# Patient Record
Sex: FEMALE | Race: WHITE | NOT HISPANIC OR LATINO | Employment: OTHER | URBAN - METROPOLITAN AREA
[De-identification: names, ages, dates, MRNs, and addresses within clinical notes are randomized per-mention and may not be internally consistent; named-entity substitution may affect disease eponyms.]

---

## 2017-01-23 RX ORDER — MECLIZINE HCL 12.5 MG 12.5 MG/1
TABLET ORAL
Qty: 90 TABLET | Refills: 0 | Status: SHIPPED | OUTPATIENT
Start: 2017-01-23 | End: 2017-03-04 | Stop reason: SDUPTHER

## 2017-03-06 RX ORDER — MECLIZINE HCL 12.5 MG 12.5 MG/1
TABLET ORAL
Qty: 90 TABLET | Refills: 0 | Status: ON HOLD | OUTPATIENT
Start: 2017-03-06 | End: 2023-05-12 | Stop reason: ALTCHOICE

## 2017-03-17 DIAGNOSIS — E11.9 TYPE 2 DIABETES MELLITUS WITHOUT COMPLICATION: ICD-10-CM

## 2017-03-31 ENCOUNTER — PATIENT OUTREACH (OUTPATIENT)
Dept: ADMINISTRATIVE | Facility: HOSPITAL | Age: 67
End: 2017-03-31

## 2017-05-04 ENCOUNTER — TELEPHONE (OUTPATIENT)
Dept: CARDIOLOGY | Facility: CLINIC | Age: 67
End: 2017-05-04

## 2017-05-04 NOTE — TELEPHONE ENCOUNTER
HH nurse reporting 6# wt gain over last 5 days, pt has Bumex 1mg BID, and Aldactone 25mg daily, has appt to asee you 5/9, please advise

## 2017-05-05 ENCOUNTER — TELEPHONE (OUTPATIENT)
Dept: CARDIOLOGY | Facility: CLINIC | Age: 67
End: 2017-05-05

## 2017-05-05 NOTE — TELEPHONE ENCOUNTER
Dr Guerrier has not seen this pt for years, and will only advise after appt 5/9/17, HH will defer question to PCP

## 2017-05-09 ENCOUNTER — OFFICE VISIT (OUTPATIENT)
Dept: CARDIOLOGY | Facility: CLINIC | Age: 67
End: 2017-05-09
Payer: MEDICARE

## 2017-05-09 VITALS
SYSTOLIC BLOOD PRESSURE: 114 MMHG | BODY MASS INDEX: 34.91 KG/M2 | DIASTOLIC BLOOD PRESSURE: 66 MMHG | WEIGHT: 222.44 LBS | HEART RATE: 76 BPM | HEIGHT: 67 IN

## 2017-05-09 DIAGNOSIS — I48.0 PAF (PAROXYSMAL ATRIAL FIBRILLATION): ICD-10-CM

## 2017-05-09 DIAGNOSIS — I50.30 CONGESTIVE HEART FAILURE WITH LV DIASTOLIC DYSFUNCTION, NYHA CLASS 2: Primary | ICD-10-CM

## 2017-05-09 DIAGNOSIS — Z79.01 LONG TERM (CURRENT) USE OF ANTICOAGULANTS: ICD-10-CM

## 2017-05-09 DIAGNOSIS — E66.9 OBESITY (BMI 30.0-34.9): ICD-10-CM

## 2017-05-09 DIAGNOSIS — Z01.810 ENCOUNTER FOR PRE-OPERATIVE CARDIOVASCULAR CLEARANCE: ICD-10-CM

## 2017-05-09 PROBLEM — E66.811 OBESITY (BMI 30.0-34.9): Status: ACTIVE | Noted: 2017-05-09

## 2017-05-09 PROCEDURE — 99214 OFFICE O/P EST MOD 30 MIN: CPT | Mod: S$GLB,,, | Performed by: INTERNAL MEDICINE

## 2017-05-09 PROCEDURE — 1159F MED LIST DOCD IN RCRD: CPT | Mod: S$GLB,,, | Performed by: INTERNAL MEDICINE

## 2017-05-09 PROCEDURE — 1160F RVW MEDS BY RX/DR IN RCRD: CPT | Mod: S$GLB,,, | Performed by: INTERNAL MEDICINE

## 2017-05-09 PROCEDURE — 3078F DIAST BP <80 MM HG: CPT | Mod: S$GLB,,, | Performed by: INTERNAL MEDICINE

## 2017-05-09 PROCEDURE — 93000 ELECTROCARDIOGRAM COMPLETE: CPT | Mod: S$GLB,,, | Performed by: INTERNAL MEDICINE

## 2017-05-09 PROCEDURE — 3074F SYST BP LT 130 MM HG: CPT | Mod: S$GLB,,, | Performed by: INTERNAL MEDICINE

## 2017-05-09 PROCEDURE — 1125F AMNT PAIN NOTED PAIN PRSNT: CPT | Mod: S$GLB,,, | Performed by: INTERNAL MEDICINE

## 2017-05-09 PROCEDURE — 99999 PR PBB SHADOW E&M-EST. PATIENT-LVL III: CPT | Mod: PBBFAC,,, | Performed by: INTERNAL MEDICINE

## 2017-05-09 NOTE — PATIENT INSTRUCTIONS
Understanding Atrial Fibrillation    An arrhythmia is any problem with the speed or pattern of the heartbeat. Atrial fibrillation (AFib) is a common type of arrhythmia. It causes fast, chaotic electrical signals in the atria. This leads to poor functioning of the heart. It also affects how much blood your heart can pump out to the body.  Afib may occur once in a while and go away on its own. Or it may continue for longer periods and need treatment.  AFib can lead to serious problems, such as stroke. Your healthcare provider will need to monitor and manage it.  What happens during atrial fibrillation?   The heart has an electrical system that sends signals to control the heartbeat. As signals move through the heart, they tell the hearts upper chambers (atria) and lower chambers (ventricles) when to squeeze (contract) and relax. This lets blood move through the heart and out to the body and lungs.  With AFib, the atria receive abnormal signals. This causes them to contract in a fast and irregular way, and out of sync with the ventricles. When this happens, the atria also have a harder time moving blood into the ventricles. Blood may then pool in the atria, which increases the risk for blood clots and stroke. The ventricles also may contract too quickly and irregularly. As a result, they may not pump blood to the body and lungs as well as they should. This can weaken the heart muscle over time and cause heart failure.  What causes atrial fibrillation?  AFib is more common in older adults. It has many possible causes including:  · Coronary artery disease  · Heart valve disease  · Heart attack  · Heart surgery  · High blood pressure  · Thyroid disease  · Diabetes  · Lung disease  · Sleep apnea  · Heavy alcohol use  In some cases of AFib, doctors do not know the cause.  What are the symptoms of atrial fibrillation?  AFib may or may not cause symptoms. If symptoms do occur, they may include:  · A fast, pounding,  irregular heartbeat  · Shortness of breath  · Tiredness  · Dizziness or fainting  · Chest pain  How is atrial fibrillation treated?  Treatments for AFib can include any of the options below.  · Medicines. You may be prescribed:  ¨ Heart rate medicines to help slow down the heartbeat  ¨ Heart rhythm medicines to help the heart beat more regularly  ¨ Anti-clotting medicines to help reduce the risk for blood clots and stroke  · Electrical cardioversion. Your healthcare provider uses special pads or paddles to send one or more brief electrical shocks to the heart. This can help reset the heartbeat to normal.  · Ablation. Long, thin tubes called catheters are threaded through a blood vessel to the heart. There, the catheters send out hot or cold energy to the areas causing the abnormal signals. This energy destroys the problem tissue or cells. This improves the chances that your heart will stay in normal rhythm without using medicines. If your heart rate and rhythm cant be controlled, you may need ablation and a pacemaker. These will help control the heart rate and regularity of the heartbeat.  · Surgery. During surgery, your healthcare provider may use different methods to create scar tissue in the areas of the heart causing the abnormal signals. The scar tissue disrupts the abnormal signals and may stop AFib from occurring.  What are the complications of atrial fibrillation?  These can include:  · Blood clots  · Stroke  · Heart failure. This problem occurs when the heart muscle weakens so much that it can no longer pump blood well.  When should I call my healthcare provider?  Call your healthcare provider right away if you have any of these:  · Symptoms that dont get better with treatment, or get worse  · New symptoms   Date Last Reviewed: 5/1/2016  © 9042-7816 [employer]. [address] [place] [address], [place] [address]. All rights reserved. This information is not intended as a substitute for professional  medical care. Always follow your healthcare professional's instructions.        Eating Heart-Healthy Foods  Eating has a big impact on your heart health. In fact, eating healthier can improve several of your heart risks at once. For instance, it helps you manage weight, cholesterol, and blood pressure. Here are ideas to help you make heart-healthy changes without giving up all the foods and flavors you love.  Getting started  · Talk with your health care provider about eating plans, such as the DASH or Mediterranean diet. You may also be referred to a dietitian.  · Change a few things at a time. Give yourself time to get used to a few eating changes before adding more.  · Work to create a tasty, healthy eating plan that you can stick to for the rest of your life.    Goals for healthy eating  Below are some tips to improve your eating habits:  · Limit saturated fats and trans fats. Saturated fats raise your levels of cholesterol, so keep these fats to a minimum. They are found in foods such as fatty meats, whole milk, cheese, and palm and coconut oils. Avoid trans fats because they lower good cholesterol as well as raise bad cholesterol. Trans fats are most often found in processed foods.  · Reduce sodium (salt) intake. Eating too much salt may increase your blood pressure. Limit your sodium intake to 2,300 milligrams (mg) per day, or less if your health care provider recommends it. Dining out less often and eating fewer processed foods are two great ways to decrease the amount of salt you consume.  · Managing calories. A calorie is a unit of energy. Your body burns calories for fuel, but if you eat more calories than your body burns, the extras are stored as fat. Your health care provider can help you create a diet plan to manage your calories. This will likely include eating healthier foods as well as exercising regularly. To help you track your progress, keep a diary to record what you eat and how often you  exercise.  Choose the right foods  Aim to make these foods staples of your diet. If you have diabetes, you may have different recommendations than what is listed here:  · Fruits and vegetable provide plenty of nutrients without a lot of calories. At meals, fill half your plate with these foods. Split the other half of your plate between whole grains and lean protein.  · Whole grains are high in fiber and rich in vitamins and nutrients. Good choices include whole-wheat bread, pasta, and brown rice.  · Lean proteins give you nutrition with less fat. Good choices include fish, skinless chicken, and beans.  · Low-fat or nonfat dairy provides nutrients without a lot of fat. Try low-fat or nonfat milk, cheese, or yogurt.  · Healthy fats can be good for you in small amounts. These are unsaturated fats, such as olive oil, nuts, and fish. Try to have at least 2 servings per week of fatty fish such as salmon, sardines, mackerel, rainbow trout, and albacore tuna. These contain omega-3 fatty acids, which are good for your heart. Flaxseed is another source of a heart-healthy fat.  More on heart healthy eating    Read food labels  Healthy eating starts at the grocery store. Be sure to pay attention to food labels on packaged foods. Look for products that are high in fiber and protein, and low in saturated fat, cholesterol, and sodium. Avoid products that contain trans fat. And pay close attention to serving size. For instance, if you plan to eat two servings, double all the numbers on the label.  Prepare food right  A key part of healthy cooking is cutting down on added fat and salt. Look on the internet for lower-fat, lower-sodium recipes. Also, try these tips:  · Remove fat from meat and skin from poultry before cooking.  · Skim fat from the surface of soups and sauces.  · Broil, boil, bake, steam, grill, and microwave food without added fats.  · Choose ingredients that spice up your food without adding calories, fat, or  sodium. Try these items: horseradish, hot sauce, lemon, mustard, nonfat salad dressings, and vinegar. For salt-free herbs and spices, try basil, cilantro, cinnamon, pepper, and rosemary.  Date Last Reviewed: 6/25/2015  © 6963-9570 [employer]. [address] [place] [address], [place] [address]. All rights reserved. This information is not intended as a substitute for professional medical care. Always follow your healthcare professional's instructions.        Heart Disease Education    The heart beats 60 to 100 times per minute, 24 hours a day. This equals almost 1000,000 times a day. It pumps blood with oxygen and nutrients to the tissues and organs of the body. But the heart is a muscle and needs its own supply of blood. Blood flow to the heart is supplied by the coronary arteries. Coronary artery disease (atherosclerosis) is a result of cholesterol, saturated fat, and calcium deposits (plaques) that build up inside the walls. This causes inflammation within the coronary arteries. These plaques narrow the artery and reduce blood flow to the heart muscle. The reduction in blood flow to the heart muscle decreases oxygen supply to the heart. If the narrowing is significant enough, the oxygen supply to one or more regions of the heart can be temporarily or permanently shut down. This can cause chest pain, and possibly death of heart tissue (heart attack).  Types of chest pain  Angina is the name for pain in the heart muscle. Angina is a warning sign of serious heart disease. When untreated it can lead to a heart attack, also known as acute myocardial infarction, or AMI. Angina occurs when there is not enough blood and oxygen flowing to the heart for the amount of work it is doing. This most often happens during physical exertion, when the heart is working hardest. It is usually relieved by rest or nitroglycerin. Angina may also occur after a large meal when extra blood is sent to the digestive organs and less goes  to the heart. In the case of advanced or unstable heart disease, angina can occur at rest or awaken you from sleep. Angina usually lasts from a few minutes up to 20 minutes or more. When treated early, the effects of angina can be reversed without permanent damage to the heart. Angina is a serious condition and needs to be evaluated by a medical professional immediately.  There are two types of angina -- stable and unstable:  · Stable angina usually occurs with a predictable level of activity. Being stable, its character, severity, and occurrence do not change much over time. It usually starts with activity, and resolves with rest or taking your medicine as instructed by your doctor. The symptoms usually do not last long.  · Unstable angina changes or gets worse over time. It is different from whatever you are used to. It may feel different or worse, begin without cause, occur with exercise or exertion, wake you up from sleep, and last longer. It may not respond in the same way as it does when you take your usual medicines for an attack. This type of angina can be a warning sign of an impending heart attack.     A heart attack is usually the result of a blood clot that suddenly forms in a coronary artery that has been narrowed with plaque. When this occurs, blood flow may be cut off to a part of the heart muscle, causing the cells to die. This weakens the pumping action of the heart, which affects the delivery of blood to all the other organs in the body including the brain. This damage is not reversible. However, early treatment can limit the amount of damage.  The pain you feel with angina and a heart attack may have a similar quality. However, it is usually different in intensity and duration. Here are some typical descriptions of a heart attack:  · It is most often experienced as a squeezing, crushing, pressure-like sensation in the center of the chest.  · It is sometimes described as something heavy sitting on  "my chest.  · It may feel more like a bad case of indigestion.  · The pain may spread from the chest to the arm, shoulder, throat or jaw.  · Sometimes the pain is not felt in the chest at all, but only in the arm, shoulder, throat or jaw.  · There may also be nausea, vomiting, dizziness or light-headedness, sweating and trouble breathing.  · Palpitations, or your heart beating rapidly  · A new, irregular heart beat  · Unexplained weakness  You may not be able to tell the difference between "bad" angina and a heart attack at home. Seek help if your symptoms are different than usual. Do not be in denial or just try to "tough it out."  Call 911  This is the fastest and safest way to get to the emergency department. The paramedics can also start treatment on the way to the hospital, saving valuable time for your heart.  · If the angina gets worse, if it continues, or if it stops and returns, call 911 immediately. Do not delay. You may be having a heart attack.  · After you call 911, take a second tablet or spray unless instructed otherwise. When repeating doses, sit down if possible, because it can make you feel lightheaded or dizzy. Wait another 5 minutes. If the angina still does not go away, take a third tablet or spray. Do not take more than 3 tablets or sprays within 15 minutes. Stay on the phone with 911 for further instruction.  · Your healthcare provider may give you slightly different instructions than those above. If so, follow them carefully.  Do not wait until symptoms become severe to call 911.  Other reasons to call 911 include:  · Trouble breathing  · Feeling lightheaded, faint, or dizzy  · Rapid heart beat  · Slower than usual heart rate compared to your normal  · Angina with weakness, dizziness, fainting, heavy sweating, nausea, or vomiting  · Extreme drowsiness, confusion  · Weakness of an arm or leg or one side of the face  · Difficulty with speech or vision  When to seek medical care  Remember, the " "signs and symptoms of a heart attack are not always like they are on TV. Sometimes they are not so obvious. You may only feel weak, or just not right. If it is not clear or if you have any doubt, call for advice.  · Seek help if there is a change in the type of pain, if it feels different, or if your symptoms are mild.  · Do not drive yourself. Have someone else drive you. If no one can drive, call 911.  · Do not delay. Fast diagnosis and treatment can prevent or limit the amount of heart damage during a heart attack.  · Do not go to your doctor's office or a clinic as they may not be able to provide all the testing and treatment required for this condition.  · If your doctor has given you medicine to take when symptoms occur, take them but don't delay getting help trying to locate medicines.  What happens in the emergency department  The emergency department is connected to your local emergency medical system (EMS) through 911. That's why during a cardiac emergency, calling 911 is the fastest way to get help. The goal of the emergency department is to rapidly screen, evaluate, and treat people.  Once you are there, an electrocardiogram (ECG or heart tracing) will be done. Blood samples may be taken to look for the presence of heart enzymes that leak from damaged heart cells and show if a heart attack is occurring. You will often be evaluated by a heart specialist (cardiologist) who decides the best course of action. In the case of severe angina or early heart attack, and depending on the circumstances, powerful "clot busting" medicines can be used to dissolve blood clots in the coronary artery. In other cases, you may be taken to a cardiac catheterization lab. Here, a tiny balloon-tipped catheter is advanced through blood vessels to the heart. There the balloon is inflated pushing open the blood vessel restoring blood flow.  Risk factors for heart disease  Risk factors for heart disease are a combination of genetic " and lifestyle. Many risk factors work by either directly or indirectly damaging the blood vessels of the heart, or by increasing the risk of forming blood or cholesterol clots, which then clog up and block the arteries.     Examples of physical lifestyle risk factors:  · Cigarette smoking  · High blood pressure  · High blood cholesterol  · Use of stimulant drugs such as cocaine, crack, and amphetamines  · Eating a high-fat, high-cholesterol meal  · Diabetes   · Obesity which increases risk for diabetes and high blood pressure  · Lack of regular physical activity     Examples of emotional lifestyle factors:  · Chronic high stress levels release stress hormones. These raise blood pressure and cholesterol level and makes blood clot more easily.  · Held-in anger, hostile or cynical attitude  · Social and emotional isolation, lack of intimacy  · Loss of relationship  · Depression  Other factors that increase the risk of heart attack that you cannot control :  · Age. The older you get beyond 40, the greater is your risk of significant coronary artery disease.  · Gender. More men than women get heart disease; but once past menopause, women who are not taking estrogen replacement have the same risk as men for a heart attack.  · Family history. If your mother, father, brother or sister has coronary artery disease, your risk of having it is higher than a person your age without this family history.  What can you do to decrease your risk  To reduce your risk of heart disease:  · Get regular checkups with your doctor.  · Take your medicines for blood pressure, cholesterol or diabetes as directed.  · Watch your diet. Eat a heart healthy diet choosing fresh foods, less salt, cholesterol, and fat  · Stop smoking. Get help if needed.  · Get regular exercise.  · Manage stress.  · Carry a list of medicines and doses in your wallet.  Date Last Reviewed: 12/30/2015  © 7192-7233 [employer]. [address] [place] [address],  [place] [address]. All rights reserved. This information is not intended as a substitute for professional medical care. Always follow your healthcare professional's instructions.        What is Heart Failure?  The heart is a muscle that pumps oxygen-rich blood to all parts of the body. When you have heart failure, the heart is not able to pump as well as it should. Blood and fluid may back up into the lungs, and some parts of the body dont get enough oxygen-rich blood to work normally. These problems lead to the symptoms you feel.  When you have heart failure  With heart failure, not enough oxygen-rich blood leaves the heart with each beat. There are 2 types of heart failure. Both affect the ventricles ability to pump blood. You may have 1 or both types.       Systolic heart failure. The heart muscle becomes weak and enlarged. It cant pump enough oxygen-rich blood forward to the rest of the body when the ventricles contract. The measurement of how much blood your heart pushes out when it beats is called ejection fraction. In systolic heart failure, the ejection fraction is lower than normal. This can cause blood to back up into the lungs and cause shortness of breath and eventually ankle swelling (edema).  This is also called heart failure with reduced ejection fraction, or  HFrEF.    Diastolic heart failure. The heart muscle becomes stiff. It doesnt relax normally between contractions, which keeps the ventricles from filling with blood. Ejection fraction is often in the normal range. This can still lead to the backup of blood into the body and affect the organs such as the liver. This is also called heart failure with preserved ejection fraction or HFpEF.     Recognizing heart failure symptoms  When you have heart failure, you need to pay close attention to your body and how you feel, every single day. That way, if a problem occurs, you can get help before it becomes too severe. You'll need to watch for changes  in your symptoms. As long as symptoms stay about the same from one day to the next, your heart failure is stable. But if symptoms start to get worse, it's time to take action.  Signs and symptoms of worsening heart failure include:  · Rapid weight gain  · Shortness of breath  · Swelling (edema)  · Fatigue  How heart failure affects your body  When the heart doesn't pump enough blood, hormones (body chemicals) are sent to increase the amount of work the heart does. Some hormones make the heart grow larger. Others tell the heart to pump faster. As a result, the heart may pump more blood at first, but it can't keep up with the ongoing demands. So, the heart muscle becomes even more weak. Over time, even less blood is pumped through the heart. This leads to problems throughout the body as organs began to feel the effects of a long-term lack of oxygen. Eventually, if untreated, this can cause problems with your lungs, liver, kidneys, and loss of elasticity in the skin, and skin changes in the lower legs. A weak heart itself can eventually cause a severe decline in health and possible death if left untreated.  What is ejection fraction?  Ejection fraction (EF) measures how much blood the heart pumps out (ejects). This is measured to help diagnose heart failure. A healthy heart pumps at least half of the blood from the ventricles with each beat. This means a normal ejection fraction is around 50% to 70%. Your doctor will calculate ejection fraction from an echocardiogram.     My ejection fraction     Date: ______________________  Ejection fraction: _____________  Test used: __________________  Date Last Reviewed: 3/15/2016  © 3061-9088 [employer]. [address] [place] [address], [place] [address]. All rights reserved. This information is not intended as a substitute for professional medical care. Always follow your healthcare professional's instructions.        Weight Management: Exercise and Activity  Studies show  that people who exercise are the most likely to lose weight and keep it off. Exercise burns calories. It helps build muscle to make your body stronger. Make exercise an important part of your weight-management plan.    Make activity part of your day  You may not think you have the time to exercise. But you can work activity into your daily life--you just need to be committed. Take 10 minutes out of your lunch hour to take a walk. Walk to the [employer] to get your paper instead of having it delivered. Make it a habit to take the stairs instead of the elevator. Park in a far away parking spot instead of the closest. Youll be surprised at how fast these little changes can make a difference.  Some people really cannot walk very far, and tire out quickly with exercise. Instead of becoming discouraged, resolve to do what you can do, and work to make that a regular frequent habit.   The benefits of exercise  Exercise offers many benefits including:   · Exercise increases your metabolism (the speed at which your body burns calories).  · Regular exercise can increase the amount of muscle in your body. Muscle burns calories faster than fat. The more muscle you have, the more calories you burn.  · Exercise gives you energy and curbs your appetite.  · Exercise decreases stress and helps you sleep better.  Make exercise fun  Exercise can be fun. Choose an activity you enjoy. You may even get a friend to do it with you:  · Take a resistance-training or aerobics class  · Join a team sport  · Take a dance class  · Walk the dog  · Ride a bike  If you have health problems, be sure to ask your healthcare provider before you start an exercise program. Have a  help you develop a plan thats safe for you.   Date Last Reviewed: 2/4/2016  © 9704-2226 [employer]. [address] [place] [address], [place] [address]. All rights reserved. This information is not intended as a substitute for professional medical care.  Always follow your healthcare professional's instructions.

## 2017-05-09 NOTE — MR AVS SNAPSHOT
[facility]  [address] [facility] [address] [place] [address]  Phone: [phone]                  Aimee Aquino   2017 10:40 AM   Office Visit    Description:  Female : 1950   Provider:  Lily Guerrier MD   Department:  [place] - Cardiology           Reason for Visit     Congestive Heart Failure           Diagnoses this Visit        Comments    Congestive heart failure with LV diastolic dysfunction, NYHA class 2    -  Primary     PAF (paroxysmal atrial fibrillation)     STABLE, DECLINES COUMADIN---, HAD ISSUES IN THE PAST    Encounter for pre-operative cardiovascular clearance         Obesity (BMI 30.0-34.9)         Long term (current) use of anticoagulants                To Do List           Goals (5 Years of Data)     None      Follow-Up and Disposition     Return in about 3 months (around 2017).       These Medications        Disp Refills Start End    apixaban 2.5 mg Tab 60 tablet 3 2017     Take 1 tablet (2.5 mg total) by mouth 2 (two) times daily. - Oral    Pharmacy: [facility], [place] - [address] [facility] Ph #: [phone]         [facility]     [facility] Nurse Care [facility] -  Assistance  Unless otherwise directed by your provider, please contact [facility], our nurse care line that is available for  assistance.     Registered nurses in the [facility] provide: appointment scheduling, clinical advisement, health education, and other advisory services.  Call: [phone] (toll free)               Medications           Message regarding Medications     Verify the changes and/or additions to your medication regime listed below are the same as discussed with your clinician today.  If any of these changes or additions are incorrect, please notify your healthcare provider.        START taking these NEW medications        Refills    apixaban 2.5 mg Tab 3    Sig: Take 1 tablet (2.5 mg total) by mouth 2 (two)  times daily.    Class: Normal    Route: Oral      STOP taking these medications     calcitonin, salmon, (FORTICAL) 200 unit/actuation nasal spray 1 spray by Nasal route once daily. Each nostril    HUMALOG 100 unit/mL injection 15 Units as directed.     lisinopril (PRINIVIL,ZESTRIL) 2.5 MG tablet TAKE 1 TABLET ONCE A DAY ORALLY 90 DAYS    MAGNESIUM CARBONATE ORAL Take by mouth.    potassium chloride SA (K-DUR,KLOR-CON) 20 MEQ tablet Takes 2 tablet AM, 1 tablet with lunch, 2 tablets with Dinner, and 1 at bedtime.    spironolactone (ALDACTONE) 25 MG tablet Take 25 mg by mouth once daily.    sucralfate (CARAFATE) 1 gram tablet TAKE 1 TABLET (1 G TOTAL) BY MOUTH 4 (FOUR) TIMES DAILY.    tizanidine (ZANAFLEX) 2 MG tablet TAKE 2 TABLETS (4 MG TOTAL) BY MOUTH NIGHTLY AS NEEDED.           Verify that the below list of medications is an accurate representation of the medications you are currently taking.  If none reported, the list may be blank. If incorrect, please contact your healthcare provider. Carry this list with you in case of emergency.           Current Medications     ACETAMINOPHEN (TYLENOL EXTRA STRENGTH ORAL) Take by mouth every evening.    allopurinol (ZYLOPRIM) 300 MG tablet Take 1 tablet (300 mg total) by mouth once daily.    amitriptyline (ELAVIL) 150 MG Tab TAKE 1 TABLET (150 MG TOTAL) BY MOUTH EVERY EVENING.    ascorbic acid, vitamin C, (VITAMIN C) 1000 MG tablet Take 1,000 mg by mouth once daily.    biotin 2,500 mcg Cap Take 5,000 mg by mouth once daily.      bumetanide (BUMEX) 0.5 MG Tab TAKE 1 TABLET BY MOUTH EVERY MORNING MAY TAKE AN EXTRA DOSE IN PM FOR WEIGHT GAIN >3 LBS-2 DAYS    bumetanide (BUMEX) 1 MG tablet TAKE 1 TABLET (1 MG TOTAL) BY MOUTH 2 (TWO) TIMES DAILY.    cranberry 1,000 mg Cap Take 3,600 mg by mouth.      diclofenac sodium (VOLTAREN) 1 % Gel APPLY TOPICALLY 4 (FOUR) TIMES DAILY AS NEEDED.    dipyridamole-aspirin 200-25 mg (AGGRENOX)  mg CM12 TAKE 1 CAPSULE BY MOUTH 2 (TWO) TIMES  "DAILY.    estradiol (ESTRACE) 1 MG tablet 1 mg.     fluconazole (DIFLUCAN) 100 MG tablet TAKE 1 TABLET (100 MG TOTAL) BY MOUTH ONCE.    insulin glargine (LANTUS) 100 unit/mL injection Inject 68 Units into the skin once daily.     insulin syringe-needle U-100 (BD INSULIN SYRINGE ULTRA-FINE) 1 mL 31 x 5/16" Syrg USE 3 TIMES DAILY    lansoprazole (PREVACID) 30 MG capsule 1 CAP 30 MIN BEFORE BREAKFAST AND SUPPER    levothyroxine (SYNTHROID) 100 MCG tablet TAKE 1 TABLET (100 MCG TOTAL) BY MOUTH ONCE DAILY.    loratadine (CLARITIN) 10 mg tablet Take 10 mg by mouth once daily.    meclizine (ANTIVERT) 12.5 mg tablet TAKE 1 TABLET (12.5 MG TOTAL) BY MOUTH 3 (THREE) TIMES DAILY AS NEEDED.    mupirocin (BACTROBAN) 2 % ointment     oxycodone-acetaminophen (PERCOCET) 5-325 mg per tablet Take 1 tablet by mouth daily as needed for Pain.    PATANOL 0.1 % ophthalmic solution PLACE 1 DROP INTO BOTH EYES ONCE DAILY    pramipexole (MIRAPEX) 0.5 MG tablet TAKE 3 TABLETS BY MOUTH IN P.M.    pyridoxine, vitamin B6, (VITAMIN B-6) 100 MG Tab Take 50 mg by mouth once daily.    rosuvastatin (CRESTOR) 5 MG tablet Take 1 tablet (5 mg total) by mouth once daily.    selenium 200 mcg Cap Take 200 mg by mouth once daily.    tramadol (ULTRAM) 50 mg tablet TAKE 1 TABLET EVERY 4 HOURS AS NEEDED    vitamin D 1000 units Tab Take 185 mg by mouth once daily.    apixaban 2.5 mg Tab Take 1 tablet (2.5 mg total) by mouth 2 (two) times daily.           Clinical Reference Information           Your Vitals Were     BP Pulse Height Weight Last Period BMI    114/66 76 5' 7" (1.702 m) 100.9 kg (222 lb 7.1 oz) (LMP Unknown) 34.84 kg/m2      Blood Pressure          Most Recent Value    BP  114/66      Allergies as of 5/9/2017     Iodinated Contrast Media - Oral And Iv Dye    Adhesive Tape-silicones    Ambien [Zolpidem]    Codeine    Demerol [Meperidine]    Penicillin G    Sulfasalazine    Tetracycline      Immunizations Administered on Date of Encounter - 5/9/2017  "    None      Orders Placed During Today's Visit      Normal Orders This Visit    EKG 12-lead     Future Labs/Procedures Expected by Expires    Basic metabolic panel  5/9/2017 7/8/2018    Hemoglobin  5/9/2017 7/8/2018      [facility] Sign-Up     Activating your [facility] account is as easy as 1-2-3!     1) Visit [email or web address], select Sign Up Now, enter this activation code and your date of birth, then select Next.  [email or web address]  Expires: 6/23/2017 11:32 AM      2) Create a username and password to use when you visit [facility] in the future and select a security question in case you lose your password and select Next.    3) Enter your e-mail address and click Sign Up!    Additional Information  If you have questions, please e-mail [email or web address] or call [phone] to talk to our [facility] staff. Remember, [facility] is NOT to be used for urgent needs. For medical emergencies, dial 911.         Instructions      Understanding Atrial Fibrillation    An arrhythmia is any problem with the speed or pattern of the heartbeat. Atrial fibrillation (AFib) is a common type of arrhythmia. It causes fast, chaotic electrical signals in the atria. This leads to poor functioning of the heart. It also affects how much blood your heart can pump out to the body.  Afib may occur once in a while and go away on its own. Or it may continue for longer periods and need treatment.  AFib can lead to serious problems, such as stroke. Your healthcare provider will need to monitor and manage it.  What happens during atrial fibrillation?   The heart has an electrical system that sends signals to control the heartbeat. As signals move through the heart, they tell the hearts upper chambers (atria) and lower chambers (ventricles) when to squeeze (contract) and relax. This lets blood move through the heart and out to the body and lungs.  With AFib, the atria receive abnormal signals. This causes them to contract in a fast and irregular  way, and out of sync with the ventricles. When this happens, the atria also have a harder time moving blood into the ventricles. Blood may then pool in the atria, which increases the risk for blood clots and stroke. The ventricles also may contract too quickly and irregularly. As a result, they may not pump blood to the body and lungs as well as they should. This can weaken the heart muscle over time and cause heart failure.  What causes atrial fibrillation?  AFib is more common in older adults. It has many possible causes including:  · Coronary artery disease  · Heart valve disease  · Heart attack  · Heart surgery  · High blood pressure  · Thyroid disease  · Diabetes  · Lung disease  · Sleep apnea  · Heavy alcohol use  In some cases of AFib, doctors do not know the cause.  What are the symptoms of atrial fibrillation?  AFib may or may not cause symptoms. If symptoms do occur, they may include:  · A fast, pounding, irregular heartbeat  · Shortness of breath  · Tiredness  · Dizziness or fainting  · Chest pain  How is atrial fibrillation treated?  Treatments for AFib can include any of the options below.  · Medicines. You may be prescribed:  ¨ Heart rate medicines to help slow down the heartbeat  ¨ Heart rhythm medicines to help the heart beat more regularly  ¨ Anti-clotting medicines to help reduce the risk for blood clots and stroke  · Electrical cardioversion. Your healthcare provider uses special pads or paddles to send one or more brief electrical shocks to the heart. This can help reset the heartbeat to normal.  · Ablation. Long, thin tubes called catheters are threaded through a blood vessel to the heart. There, the catheters send out hot or cold energy to the areas causing the abnormal signals. This energy destroys the problem tissue or cells. This improves the chances that your heart will stay in normal rhythm without using medicines. If your heart rate and rhythm cant be controlled, you may need ablation  and a pacemaker. These will help control the heart rate and regularity of the heartbeat.  · Surgery. During surgery, your healthcare provider may use different methods to create scar tissue in the areas of the heart causing the abnormal signals. The scar tissue disrupts the abnormal signals and may stop AFib from occurring.  What are the complications of atrial fibrillation?  These can include:  · Blood clots  · Stroke  · Heart failure. This problem occurs when the heart muscle weakens so much that it can no longer pump blood well.  When should I call my healthcare provider?  Call your healthcare provider right away if you have any of these:  · Symptoms that dont get better with treatment, or get worse  · New symptoms   Date Last Reviewed: 5/1/2016  © 5381-4599 [employer]. [address] [place] [address], [place] [address]. All rights reserved. This information is not intended as a substitute for professional medical care. Always follow your healthcare professional's instructions.        Eating Heart-Healthy Foods  Eating has a big impact on your heart health. In fact, eating healthier can improve several of your heart risks at once. For instance, it helps you manage weight, cholesterol, and blood pressure. Here are ideas to help you make heart-healthy changes without giving up all the foods and flavors you love.  Getting started  · Talk with your health care provider about eating plans, such as the DASH or Mediterranean diet. You may also be referred to a dietitian.  · Change a few things at a time. Give yourself time to get used to a few eating changes before adding more.  · Work to create a tasty, healthy eating plan that you can stick to for the rest of your life.    Goals for healthy eating  Below are some tips to improve your eating habits:  · Limit saturated fats and trans fats. Saturated fats raise your levels of cholesterol, so keep these fats to a minimum. They are found in foods such as fatty  meats, whole milk, cheese, and palm and coconut oils. Avoid trans fats because they lower good cholesterol as well as raise bad cholesterol. Trans fats are most often found in processed foods.  · Reduce sodium (salt) intake. Eating too much salt may increase your blood pressure. Limit your sodium intake to 2,300 milligrams (mg) per day, or less if your health care provider recommends it. Dining out less often and eating fewer processed foods are two great ways to decrease the amount of salt you consume.  · Managing calories. A calorie is a unit of energy. Your body burns calories for fuel, but if you eat more calories than your body burns, the extras are stored as fat. Your health care provider can help you create a diet plan to manage your calories. This will likely include eating healthier foods as well as exercising regularly. To help you track your progress, keep a diary to record what you eat and how often you exercise.  Choose the right foods  Aim to make these foods staples of your diet. If you have diabetes, you may have different recommendations than what is listed here:  · Fruits and vegetable provide plenty of nutrients without a lot of calories. At meals, fill half your plate with these foods. Split the other half of your plate between whole grains and lean protein.  · Whole grains are high in fiber and rich in vitamins and nutrients. Good choices include whole-wheat bread, pasta, and brown rice.  · Lean proteins give you nutrition with less fat. Good choices include fish, skinless chicken, and beans.  · Low-fat or nonfat dairy provides nutrients without a lot of fat. Try low-fat or nonfat milk, cheese, or yogurt.  · Healthy fats can be good for you in small amounts. These are unsaturated fats, such as olive oil, nuts, and fish. Try to have at least 2 servings per week of fatty fish such as salmon, sardines, mackerel, rainbow trout, and albacore tuna. These contain omega-3 fatty acids, which are good for  your heart. Flaxseed is another source of a heart-healthy fat.  More on heart healthy eating    Read food labels  Healthy eating starts at the grocery store. Be sure to pay attention to food labels on packaged foods. Look for products that are high in fiber and protein, and low in saturated fat, cholesterol, and sodium. Avoid products that contain trans fat. And pay close attention to serving size. For instance, if you plan to eat two servings, double all the numbers on the label.  Prepare food right  A key part of healthy cooking is cutting down on added fat and salt. Look on the internet for lower-fat, lower-sodium recipes. Also, try these tips:  · Remove fat from meat and skin from poultry before cooking.  · Skim fat from the surface of soups and sauces.  · Broil, boil, bake, steam, grill, and microwave food without added fats.  · Choose ingredients that spice up your food without adding calories, fat, or sodium. Try these items: horseradish, hot sauce, lemon, mustard, nonfat salad dressings, and vinegar. For salt-free herbs and spices, try basil, cilantro, cinnamon, pepper, and rosemary.  Date Last Reviewed: 6/25/2015  © 8767-9708 [employer]. [address] [place] [address], [place] [address]. All rights reserved. This information is not intended as a substitute for professional medical care. Always follow your healthcare professional's instructions.        Heart Disease Education    The heart beats 60 to 100 times per minute, 24 hours a day. This equals almost 1000,000 times a day. It pumps blood with oxygen and nutrients to the tissues and organs of the body. But the heart is a muscle and needs its own supply of blood. Blood flow to the heart is supplied by the coronary arteries. Coronary artery disease (atherosclerosis) is a result of cholesterol, saturated fat, and calcium deposits (plaques) that build up inside the walls. This causes inflammation within the coronary arteries. These plaques narrow the  artery and reduce blood flow to the heart muscle. The reduction in blood flow to the heart muscle decreases oxygen supply to the heart. If the narrowing is significant enough, the oxygen supply to one or more regions of the heart can be temporarily or permanently shut down. This can cause chest pain, and possibly death of heart tissue (heart attack).  Types of chest pain  Angina is the name for pain in the heart muscle. Angina is a warning sign of serious heart disease. When untreated it can lead to a heart attack, also known as acute myocardial infarction, or AMI. Angina occurs when there is not enough blood and oxygen flowing to the heart for the amount of work it is doing. This most often happens during physical exertion, when the heart is working hardest. It is usually relieved by rest or nitroglycerin. Angina may also occur after a large meal when extra blood is sent to the digestive organs and less goes to the heart. In the case of advanced or unstable heart disease, angina can occur at rest or awaken you from sleep. Angina usually lasts from a few minutes up to 20 minutes or more. When treated early, the effects of angina can be reversed without permanent damage to the heart. Angina is a serious condition and needs to be evaluated by a medical professional immediately.  There are two types of angina -- stable and unstable:  · Stable angina usually occurs with a predictable level of activity. Being stable, its character, severity, and occurrence do not change much over time. It usually starts with activity, and resolves with rest or taking your medicine as instructed by your doctor. The symptoms usually do not last long.  · Unstable angina changes or gets worse over time. It is different from whatever you are used to. It may feel different or worse, begin without cause, occur with exercise or exertion, wake you up from sleep, and last longer. It may not respond in the same way as it does when you take your  "usual medicines for an attack. This type of angina can be a warning sign of an impending heart attack.     A heart attack is usually the result of a blood clot that suddenly forms in a coronary artery that has been narrowed with plaque. When this occurs, blood flow may be cut off to a part of the heart muscle, causing the cells to die. This weakens the pumping action of the heart, which affects the delivery of blood to all the other organs in the body including the brain. This damage is not reversible. However, early treatment can limit the amount of damage.  The pain you feel with angina and a heart attack may have a similar quality. However, it is usually different in intensity and duration. Here are some typical descriptions of a heart attack:  · It is most often experienced as a squeezing, crushing, pressure-like sensation in the center of the chest.  · It is sometimes described as something heavy sitting on my chest.  · It may feel more like a bad case of indigestion.  · The pain may spread from the chest to the arm, shoulder, throat or jaw.  · Sometimes the pain is not felt in the chest at all, but only in the arm, shoulder, throat or jaw.  · There may also be nausea, vomiting, dizziness or light-headedness, sweating and trouble breathing.  · Palpitations, or your heart beating rapidly  · A new, irregular heart beat  · Unexplained weakness  You may not be able to tell the difference between "bad" angina and a heart attack at home. Seek help if your symptoms are different than usual. Do not be in denial or just try to "tough it out."  Call 911  This is the fastest and safest way to get to the emergency department. The paramedics can also start treatment on the way to the hospital, saving valuable time for your heart.  · If the angina gets worse, if it continues, or if it stops and returns, call 911 immediately. Do not delay. You may be having a heart attack.  · After you call 911, take a second tablet or " spray unless instructed otherwise. When repeating doses, sit down if possible, because it can make you feel lightheaded or dizzy. Wait another 5 minutes. If the angina still does not go away, take a third tablet or spray. Do not take more than 3 tablets or sprays within 15 minutes. Stay on the phone with 911 for further instruction.  · Your healthcare provider may give you slightly different instructions than those above. If so, follow them carefully.  Do not wait until symptoms become severe to call 911.  Other reasons to call 911 include:  · Trouble breathing  · Feeling lightheaded, faint, or dizzy  · Rapid heart beat  · Slower than usual heart rate compared to your normal  · Angina with weakness, dizziness, fainting, heavy sweating, nausea, or vomiting  · Extreme drowsiness, confusion  · Weakness of an arm or leg or one side of the face  · Difficulty with speech or vision  When to seek medical care  Remember, the signs and symptoms of a heart attack are not always like they are on TV. Sometimes they are not so obvious. You may only feel weak, or just not right. If it is not clear or if you have any doubt, call for advice.  · Seek help if there is a change in the type of pain, if it feels different, or if your symptoms are mild.  · Do not drive yourself. Have someone else drive you. If no one can drive, call 911.  · Do not delay. Fast diagnosis and treatment can prevent or limit the amount of heart damage during a heart attack.  · Do not go to your doctor's office or a clinic as they may not be able to provide all the testing and treatment required for this condition.  · If your doctor has given you medicine to take when symptoms occur, take them but don't delay getting help trying to locate medicines.  What happens in the emergency department  The emergency department is connected to your local emergency medical system (EMS) through 911. That's why during a cardiac emergency, calling 911 is the fastest way to get  "help. The goal of the emergency department is to rapidly screen, evaluate, and treat people.  Once you are there, an electrocardiogram (ECG or heart tracing) will be done. Blood samples may be taken to look for the presence of heart enzymes that leak from damaged heart cells and show if a heart attack is occurring. You will often be evaluated by a heart specialist (cardiologist) who decides the best course of action. In the case of severe angina or early heart attack, and depending on the circumstances, powerful "clot busting" medicines can be used to dissolve blood clots in the coronary artery. In other cases, you may be taken to a cardiac catheterization lab. Here, a tiny balloon-tipped catheter is advanced through blood vessels to the heart. There the balloon is inflated pushing open the blood vessel restoring blood flow.  Risk factors for heart disease  Risk factors for heart disease are a combination of genetic and lifestyle. Many risk factors work by either directly or indirectly damaging the blood vessels of the heart, or by increasing the risk of forming blood or cholesterol clots, which then clog up and block the arteries.     Examples of physical lifestyle risk factors:  · Cigarette smoking  · High blood pressure  · High blood cholesterol  · Use of stimulant drugs such as cocaine, crack, and amphetamines  · Eating a high-fat, high-cholesterol meal  · Diabetes   · Obesity which increases risk for diabetes and high blood pressure  · Lack of regular physical activity     Examples of emotional lifestyle factors:  · Chronic high stress levels release stress hormones. These raise blood pressure and cholesterol level and makes blood clot more easily.  · Held-in anger, hostile or cynical attitude  · Social and emotional isolation, lack of intimacy  · Loss of relationship  · Depression  Other factors that increase the risk of heart attack that you cannot control :  · Age. The older you get beyond 40, the greater " is your risk of significant coronary artery disease.  · Gender. More men than women get heart disease; but once past menopause, women who are not taking estrogen replacement have the same risk as men for a heart attack.  · Family history. If your mother, father, brother or sister has coronary artery disease, your risk of having it is higher than a person your age without this family history.  What can you do to decrease your risk  To reduce your risk of heart disease:  · Get regular checkups with your doctor.  · Take your medicines for blood pressure, cholesterol or diabetes as directed.  · Watch your diet. Eat a heart healthy diet choosing fresh foods, less salt, cholesterol, and fat  · Stop smoking. Get help if needed.  · Get regular exercise.  · Manage stress.  · Carry a list of medicines and doses in your wallet.  Date Last Reviewed: 12/30/2015  © 3795-6728 [employer]. [address] [place] [address]. All rights reserved. This information is not intended as a substitute for professional medical care. Always follow your healthcare professional's instructions.        What is Heart Failure?  The heart is a muscle that pumps oxygen-rich blood to all parts of the body. When you have heart failure, the heart is not able to pump as well as it should. Blood and fluid may back up into the lungs, and some parts of the body dont get enough oxygen-rich blood to work normally. These problems lead to the symptoms you feel.  When you have heart failure  With heart failure, not enough oxygen-rich blood leaves the heart with each beat. There are 2 types of heart failure. Both affect the ventricles ability to pump blood. You may have 1 or both types.       Systolic heart failure. The heart muscle becomes weak and enlarged. It cant pump enough oxygen-rich blood forward to the rest of the body when the ventricles contract. The measurement of how much blood your heart pushes out when it beats is called  ejection fraction. In systolic heart failure, the ejection fraction is lower than normal. This can cause blood to back up into the lungs and cause shortness of breath and eventually ankle swelling (edema).  This is also called heart failure with reduced ejection fraction, or  HFrEF.    Diastolic heart failure. The heart muscle becomes stiff. It doesnt relax normally between contractions, which keeps the ventricles from filling with blood. Ejection fraction is often in the normal range. This can still lead to the backup of blood into the body and affect the organs such as the liver. This is also called heart failure with preserved ejection fraction or HFpEF.     Recognizing heart failure symptoms  When you have heart failure, you need to pay close attention to your body and how you feel, every single day. That way, if a problem occurs, you can get help before it becomes too severe. You'll need to watch for changes in your symptoms. As long as symptoms stay about the same from one day to the next, your heart failure is stable. But if symptoms start to get worse, it's time to take action.  Signs and symptoms of worsening heart failure include:  · Rapid weight gain  · Shortness of breath  · Swelling (edema)  · Fatigue  How heart failure affects your body  When the heart doesn't pump enough blood, hormones (body chemicals) are sent to increase the amount of work the heart does. Some hormones make the heart grow larger. Others tell the heart to pump faster. As a result, the heart may pump more blood at first, but it can't keep up with the ongoing demands. So, the heart muscle becomes even more weak. Over time, even less blood is pumped through the heart. This leads to problems throughout the body as organs began to feel the effects of a long-term lack of oxygen. Eventually, if untreated, this can cause problems with your lungs, liver, kidneys, and loss of elasticity in the skin, and skin changes in the lower legs. A weak  heart itself can eventually cause a severe decline in health and possible death if left untreated.  What is ejection fraction?  Ejection fraction (EF) measures how much blood the heart pumps out (ejects). This is measured to help diagnose heart failure. A healthy heart pumps at least half of the blood from the ventricles with each beat. This means a normal ejection fraction is around 50% to 70%. Your doctor will calculate ejection fraction from an echocardiogram.     My ejection fraction     Date: ______________________  Ejection fraction: _____________  Test used: __________________  Date Last Reviewed: 3/15/2016  © 2609-7068 [employer]. [address] [place] [address], [place] [address]. All rights reserved. This information is not intended as a substitute for professional medical care. Always follow your healthcare professional's instructions.        Weight Management: Exercise and Activity  Studies show that people who exercise are the most likely to lose weight and keep it off. Exercise burns calories. It helps build muscle to make your body stronger. Make exercise an important part of your weight-management plan.    Make activity part of your day  You may not think you have the time to exercise. But you can work activity into your daily life--you just need to be committed. Take 10 minutes out of your lunch hour to take a walk. Walk to the [employer] to get your paper instead of having it delivered. Make it a habit to take the stairs instead of the elevator. Park in a far away parking spot instead of the closest. Youll be surprised at how fast these little changes can make a difference.  Some people really cannot walk very far, and tire out quickly with exercise. Instead of becoming discouraged, resolve to do what you can do, and work to make that a regular frequent habit.   The benefits of exercise  Exercise offers many benefits including:   · Exercise increases your metabolism (the speed at which your  body burns calories).  · Regular exercise can increase the amount of muscle in your body. Muscle burns calories faster than fat. The more muscle you have, the more calories you burn.  · Exercise gives you energy and curbs your appetite.  · Exercise decreases stress and helps you sleep better.  Make exercise fun  Exercise can be fun. Choose an activity you enjoy. You may even get a friend to do it with you:  · Take a resistance-training or aerobics class  · Join a team sport  · Take a dance class  · Walk the dog  · Ride a bike  If you have health problems, be sure to ask your healthcare provider before you start an exercise program. Have a  help you develop a plan thats safe for you.   Date Last Reviewed: 2/4/2016 © 2000-2016 [employer]. [address] [place] [address]. All rights reserved. This information is not intended as a substitute for professional medical care. Always follow your healthcare professional's instructions.             Language Assistance Services     ATTENTION: Language assistance services are available, free of charge. Please call [phone].      ATENCIÓN: Si habla español, tiene a ortiz disposición servicios gratuitos de asistencia lingüística. Llame al [phone].     [facility] Ý: N?u b?n nói Ti?ng Vi?t, có các d?ch v? h? tr? ngôn ng? mi?n phí dành cho b?n. G?i s? [phone].         [facility] complies with applicable Federal civil rights laws and does not discriminate on the basis of race, color, national origin, age, disability, or sex.

## 2017-05-09 NOTE — PROGRESS NOTES
Subjective:    Patient ID:  Aimee Aquino is a 67 y.o. female who presents for Congestive Heart Failure (Clearance for left foot and toes by  Dr. Meza)      HPI  PT WAS FOLLOWED BY DR ONEILL BEFORE, THEN WAS SEEN LAST YEAR, ECHO 8/2016, MILD AS, NORMAL LV FX, DX WITH DIASTOLIC HEART FAILURE, STRESS TEST LAT AUGUST LOW RISK, ECHO 1/ 2017 EF > 60%, MILD LVH, DIASTOLIC DYSFUNCTION, NEEDS FOOT SURGERY,DX WITH STAGE 4 CKD, DR EDGAR,OFF LISINOPRIL, SEES DR MAHOGANY MCCORMACK NEPHROLOGIST,  POOR HISTORIAN, HAD LABS LAST WEEK, K WAS HIGH, SEE ROS  Past Medical History:   Diagnosis Date    Anemia     will be starting iron infusions 2015    Anxiety     Aortic stenosis     Arthritis     Ascites     Atrial fibrillation     NOT SURE WHEN    Back pain, chronic     has spinal stimulator    Bladder spasms     Cancer     skin CA    CHF (congestive heart failure)     CKD (chronic kidney disease), stage III     not on dialysis    Coronary artery disease     Diabetes mellitus     Fibromyalgia     GERD (gastroesophageal reflux disease)     Gout     Heart murmur     History of urinary urgency     Hyperlipidemia     Hypertension     controlled with diuretic, sometimes hypotension    Neuropathy     Oxygen dependent     2 lpm most of the time    Restless legs syndrome     severe    Scoliosis     Shingles     Unsure of dates    Sleep apnea     CPAP, use with oxygen    Stenosis of aortic and mitral valves     Stroke     x3    Thyroid disease     hypothyroid    Venous insufficiency of leg     wears pumps on legs, boots    Vertigo     uses Antivert 3 times every day     Past Surgical History:   Procedure Laterality Date    APPENDECTOMY      CARDIAC CATHETERIZATION  2010    no stents    CATARACT EXTRACTION W/  INTRAOCULAR LENS IMPLANT Bilateral     CHOLECYSTECTOMY      EYE SURGERY      cataracts removed    GASTRIC BYPASS  2010    HYSTERECTOMY      PARTIAL HYSTERECTOMY  1980's they took one ovary only    SC  EXPLORATORY OF ABDOMEN      w/ RT salpingo-oopherectomy    SHOULDER SURGERY      left shoulder    SPINAL CORD STIMULATOR IMPLANT      TONSILLECTOMY, ADENOIDECTOMY      TOTAL KNEE ARTHROPLASTY Bilateral     VASCULAR SURGERY  Sept 2014    EVLT saphenous, left leg     Family History   Problem Relation Age of Onset    Diabetes Mother     Stroke Mother     Heart disease Mother     Heart attack Father     Heart disease Father     Heart disease Sister     Cancer Maternal Aunt     Stroke Paternal Aunt     Diabetes Paternal Aunt     Heart disease Maternal Grandfather     Cancer Maternal Grandfather     Heart attack Paternal Grandfather     Cancer Maternal Aunt      Social History     Social History    Marital status:      Spouse name: N/A    Number of children: N/A    Years of education: N/A     Social History Main Topics    Smoking status: Former Smoker     Quit date: 1/20/2010    Smokeless tobacco: Never Used    Alcohol use No    Drug use: No    Sexual activity: No     Other Topics Concern    None     Social History Narrative       Review of patient's allergies indicates:   Allergen Reactions    Iodinated contrast media - oral and iv dye Swelling     Lip and face swelling    Adhesive tape-silicones Rash     Use paper tape    Ambien [zolpidem] Other (See Comments)     sleepwalking    Codeine Rash    Demerol [meperidine] Rash    Penicillin g Rash     abcess    Sulfasalazine Rash    Tetracycline Rash       Current Outpatient Prescriptions:     ACETAMINOPHEN (TYLENOL EXTRA STRENGTH ORAL), Take by mouth every evening., Disp: , Rfl:     allopurinol (ZYLOPRIM) 300 MG tablet, Take 1 tablet (300 mg total) by mouth once daily., Disp: 30 tablet, Rfl: 5    amitriptyline (ELAVIL) 150 MG Tab, TAKE 1 TABLET (150 MG TOTAL) BY MOUTH EVERY EVENING., Disp: , Rfl: 5    ascorbic acid, vitamin C, (VITAMIN C) 1000 MG tablet, Take 1,000 mg by mouth once daily., Disp: , Rfl:     biotin 2,500 mcg Cap,  "Take 5,000 mg by mouth once daily.  , Disp: , Rfl:     bumetanide (BUMEX) 0.5 MG Tab, TAKE 1 TABLET BY MOUTH EVERY MORNING MAY TAKE AN EXTRA DOSE IN PM FOR WEIGHT GAIN >3 LBS-2 DAYS, Disp: 60 tablet, Rfl: 5    bumetanide (BUMEX) 1 MG tablet, TAKE 1 TABLET (1 MG TOTAL) BY MOUTH 2 (TWO) TIMES DAILY., Disp: 60 tablet, Rfl: 6    cranberry 1,000 mg Cap, Take 3,600 mg by mouth.  , Disp: , Rfl:     diclofenac sodium (VOLTAREN) 1 % Gel, APPLY TOPICALLY 4 (FOUR) TIMES DAILY AS NEEDED., Disp: 100 g, Rfl: 3    dipyridamole-aspirin 200-25 mg (AGGRENOX)  mg CM12, TAKE 1 CAPSULE BY MOUTH 2 (TWO) TIMES DAILY., Disp: 60 capsule, Rfl: 6    estradiol (ESTRACE) 1 MG tablet, 1 mg. , Disp: , Rfl: 7    fluconazole (DIFLUCAN) 100 MG tablet, TAKE 1 TABLET (100 MG TOTAL) BY MOUTH ONCE., Disp: 5 tablet, Rfl: 3    insulin glargine (LANTUS) 100 unit/mL injection, Inject 68 Units into the skin once daily. , Disp: , Rfl:     insulin syringe-needle U-100 (BD INSULIN SYRINGE ULTRA-FINE) 1 mL 31 x 5/16" Syrg, USE 3 TIMES DAILY, Disp: 100 each, Rfl: 6    lansoprazole (PREVACID) 30 MG capsule, 1 CAP 30 MIN BEFORE BREAKFAST AND SUPPER, Disp: , Rfl: 11    levothyroxine (SYNTHROID) 100 MCG tablet, TAKE 1 TABLET (100 MCG TOTAL) BY MOUTH ONCE DAILY., Disp: 30 tablet, Rfl: 5    loratadine (CLARITIN) 10 mg tablet, Take 10 mg by mouth once daily., Disp: , Rfl:     meclizine (ANTIVERT) 12.5 mg tablet, TAKE 1 TABLET (12.5 MG TOTAL) BY MOUTH 3 (THREE) TIMES DAILY AS NEEDED., Disp: 90 tablet, Rfl: 0    mupirocin (BACTROBAN) 2 % ointment, , Disp: , Rfl:     oxycodone-acetaminophen (PERCOCET) 5-325 mg per tablet, Take 1 tablet by mouth daily as needed for Pain., Disp: 30 tablet, Rfl: 0    PATANOL 0.1 % ophthalmic solution, PLACE 1 DROP INTO BOTH EYES ONCE DAILY, Disp: 5 mL, Rfl: 3    pramipexole (MIRAPEX) 0.5 MG tablet, TAKE 3 TABLETS BY MOUTH IN P.M., Disp: 90 tablet, Rfl: 3    pyridoxine, vitamin B6, (VITAMIN B-6) 100 MG Tab, Take 50 mg " by mouth once daily., Disp: , Rfl:     rosuvastatin (CRESTOR) 5 MG tablet, Take 1 tablet (5 mg total) by mouth once daily., Disp: 30 tablet, Rfl: 5    selenium 200 mcg Cap, Take 200 mg by mouth once daily., Disp: 30 capsule, Rfl: 5    tramadol (ULTRAM) 50 mg tablet, TAKE 1 TABLET EVERY 4 HOURS AS NEEDED, Disp: 60 tablet, Rfl: 0    vitamin D 1000 units Tab, Take 185 mg by mouth once daily., Disp: , Rfl:     [DISCONTINUED] insulin needles, disposable, Ndle, 1 inch by Misc.(Non-Drug; Combo Route) route 3 (three) times daily before meals., Disp: 100 each, Rfl: 3    Review of Systems   Constitution: Negative for chills, diaphoresis, weakness, malaise/fatigue and night sweats. Weight gain: SOME.   HENT: Negative for congestion, hearing loss and nosebleeds.    Eyes: Negative for blurred vision, discharge, double vision and visual disturbance.   Cardiovascular: Negative for chest pain, claudication, cyanosis, irregular heartbeat, near-syncope, orthopnea, palpitations, paroxysmal nocturnal dyspnea and syncope. Dyspnea on exertion: MILD. Leg swelling: MILD.   Respiratory: Negative for cough, hemoptysis, shortness of breath, sleep disturbances due to breathing, snoring, sputum production and wheezing.    Endocrine: Negative for cold intolerance, heat intolerance and polyuria.   Hematologic/Lymphatic: Negative for adenopathy. Does not bruise/bleed easily.   Skin: Negative for color change, itching and nail changes.   Musculoskeletal: Positive for arthritis and joint pain. Negative for falls and stiffness. Back pain: CHRONIC.   Gastrointestinal: Negative for bloating, abdominal pain, change in bowel habit, constipation, dysphagia, flatus, heartburn, hematemesis, jaundice, melena and vomiting.   Genitourinary: Negative for dysuria, flank pain, frequency and incomplete emptying.   Neurological: Negative for brief paralysis, difficulty with concentration, disturbances in coordination, dizziness, focal weakness,  "light-headedness, loss of balance, numbness, paresthesias, seizures, sensory change and tremors.   Psychiatric/Behavioral: Negative for altered mental status, depression, memory loss and substance abuse. The patient is not nervous/anxious.    Allergic/Immunologic: Negative for persistent infections.        Objective:      Vitals:    05/09/17 1055   BP: 114/66   Pulse: 76   Weight: 100.9 kg (222 lb 7.1 oz)   Height: 5' 7" (1.702 m)   PainSc:   8     Body mass index is 34.84 kg/(m^2).    Physical Exam   Constitutional: She is oriented to person, place, and time. She appears well-developed and well-nourished. She is active.   OBESE, OLDER LOOKING   HENT:   Head: Normocephalic and atraumatic.   Mouth/Throat: Oropharynx is clear and moist and mucous membranes are normal.   Eyes: Conjunctivae and EOM are normal. Pupils are equal, round, and reactive to light.   Neck: Trachea normal and normal range of motion. Neck supple. Normal carotid pulses, no hepatojugular reflux and no JVD present. Carotid bruit is not present. No tracheal deviation, no edema and no erythema present. No thyromegaly present.   Cardiovascular: Normal rate and regular rhythm.   No extrasystoles are present. PMI is not displaced.  Exam reveals no gallop and no friction rub.    Murmur heard.   Systolic murmur is present with a grade of 1/6  at the upper right sternal border  Pulses:       Carotid pulses are 2+ on the right side, and 2+ on the left side.       Radial pulses are 2+ on the right side, and 2+ on the left side.        Femoral pulses are 2+ on the right side, and 2+ on the left side.       Dorsalis pedis pulses are 2+ on the right side, and 2+ on the left side.        Posterior tibial pulses are 2+ on the right side, and 2+ on the left side.   TRACE EDEMA   Pulmonary/Chest: Effort normal and breath sounds normal. No tachypnea and no bradypnea. She has no wheezes. She has no rales. She exhibits no tenderness.   Abdominal: Soft. Bowel sounds are " normal. She exhibits no distension and no mass. There is no hepatosplenomegaly. There is no tenderness. There is no guarding and no CVA tenderness.   Musculoskeletal: Normal range of motion. She exhibits no edema or tenderness.   SLOW GAIT, WALKER   Lymphadenopathy:     She has no cervical adenopathy.   Neurological: She is alert and oriented to person, place, and time. She has normal strength. She displays no tremor. No cranial nerve deficit. She exhibits normal muscle tone. Coordination normal.   Skin: Skin is warm and dry. No rash noted. No cyanosis or erythema. No pallor.   Psychiatric: She has a normal mood and affect. Her speech is normal and behavior is normal. Judgment and thought content normal.               ..    Chemistry        Component Value Date/Time     10/25/2016 1253    K 3.9 10/25/2016 1253    CL 98 10/25/2016 1253    CO2 33 (H) 10/25/2016 1253    BUN 43 (H) 10/25/2016 1253    CREATININE 1.25 10/25/2016 1253     10/25/2016 1253        Component Value Date/Time    CALCIUM 8.2 (L) 10/25/2016 1253    ALKPHOS 63 10/25/2016 1253    AST 25 10/25/2016 1253    ALT 40 10/25/2016 1253    BILITOT 0.4 10/25/2016 1253            ..  Lab Results   Component Value Date    CHOL 123 08/18/2016     Lab Results   Component Value Date    HDL 33 (L) 08/18/2016     Lab Results   Component Value Date    LDLCALC 54.4 (L) 08/18/2016     Lab Results   Component Value Date    TRIG 178 (H) 08/18/2016     Lab Results   Component Value Date    CHOLHDL 26.8 08/18/2016     ..  Lab Results   Component Value Date    WBC 10.38 10/25/2016    HGB 10.0 (L) 10/25/2016    HCT 32.5 (L) 10/25/2016     (H) 10/25/2016     10/25/2016       Test(s) Reviewed  I have reviewed the following in detail:  [] Stress test   [] Angiography   [] Echocardiogram   [] Labs   [x] Other:       Assessment:         ICD-10-CM ICD-9-CM   1. Congestive heart failure with LV diastolic dysfunction, NYHA class 2 I50.30 428.30     428.0    2. PAF (paroxysmal atrial fibrillation) I48.0 427.31   3. Encounter for pre-operative cardiovascular clearance Z01.810 V72.81   4. Obesity (BMI 30.0-34.9) E66.9 278.00   5. Long term (current) use of anticoagulants Z79.01 V58.61     Problem List Items Addressed This Visit        Cardiology Problems    Congestive heart failure with LV diastolic dysfunction, NYHA class 2 - Primary    PAF (paroxysmal atrial fibrillation)       Other    Obesity (BMI 30.0-34.9)    Encounter for pre-operative cardiovascular clearance    Long term (current) use of anticoagulants           Plan:     EKG SR, POOR R PROGRESSION, NO OVERT HF, EDEMA AT TIMES WITH IMMOBILITY, NORMAL LV, AVOID OVER-DIEURESIS,NEEDS ANTI-COAGULATION WITH HIGH CHADS SCORE, DISCUSSED ON PHONE WITH DR EDGAR PCP, WILL ADD ELIQUIS, LOW DOSE, TO GET LABS FROM PCP, ACCEPTABLE RISK FOR FOOT SURGERY, DIET, DAILY WEIGHT, RTC IN 3 MO WITH BMP, EXPLAINED PLAN TO PT/ FRIEND CATARINA      Congestive heart failure with LV diastolic dysfunction, NYHA class 2    PAF (paroxysmal atrial fibrillation)  Comments:  STABLE, DECLINES COUMADIN---, HAD ISSUES IN THE PAST    Encounter for pre-operative cardiovascular clearance    Obesity (BMI 30.0-34.9)    Long term (current) use of anticoagulants  RTC Low level/low impact aerobic exercise 5x's/wk. Heart healthy diet and risk factor modification.    See labs and med orders.    Aerobic exercise 5x's/wk. Heart healthy diet and risk factor modification.    See labs and med orders.

## 2017-05-12 DIAGNOSIS — E11.9 TYPE 2 DIABETES MELLITUS WITHOUT COMPLICATION: ICD-10-CM

## 2017-05-26 ENCOUNTER — TELEPHONE (OUTPATIENT)
Dept: CARDIOLOGY | Facility: CLINIC | Age: 67
End: 2017-05-26

## 2017-05-26 NOTE — TELEPHONE ENCOUNTER
----- Message from Nataliya Luís sent at 5/26/2017  9:05 AM CDT -----  Contact: Susan vasquez/ Dr Susana vasquez/ Dr Meza calling in regards to requesting a copy of the Cardiac Clearance. She also needs the patient to stop taking Eloquis 2.5 mg for 3 days.The patient said she had the clearance on 5/9/17. Please fax to , attention to Susan. Please advise.  Call back Susan .  Thanks!

## 2017-06-26 ENCOUNTER — PATIENT OUTREACH (OUTPATIENT)
Dept: ADMINISTRATIVE | Facility: HOSPITAL | Age: 67
End: 2017-06-26

## 2017-06-26 NOTE — LETTER
June 26, 2017    Aimee Aquino  [address]  [facility] [address]             [facility]  [address]  [facility] [address]  Phone: [phone] Dear Ms. Aquino:    [facility] is committed to your overall health and would like to ensure that you are up to date on your recommended health testing.   Dr. John has found that you may be due for the following:    Tetanus immunization  Shingles immunization  Pneumonia immunization  Diabetic Foot Exam  Annual Dilated Eye Exam  Hemoglobin A1C    If you have had any of the above done at another facility, please let us know by calling or faxing to the numbers below so that your medical record can be updated. If you have a copy of these records, please fax them to the fax number below.  If not, please call [phone] so that we can get the necessary information to obtain copies from that facility.     Otherwise, please schedule these appointments at your earliest convenience by calling [phone] or going to [email or web address].        If you have any questions or concerns, please don't hesitate to call.    Sincerely,    Abimbola Ramos  Clinical Care Coordinator  [facility]  [address] [facility] [place] [address]  Phone: [phone]   Fax: [phone]

## 2017-08-23 ENCOUNTER — OFFICE VISIT (OUTPATIENT)
Dept: CARDIOLOGY | Facility: CLINIC | Age: 67
End: 2017-08-23
Payer: MEDICARE

## 2017-08-23 VITALS
BODY MASS INDEX: 37.09 KG/M2 | HEART RATE: 81 BPM | HEIGHT: 67 IN | SYSTOLIC BLOOD PRESSURE: 102 MMHG | OXYGEN SATURATION: 94 % | DIASTOLIC BLOOD PRESSURE: 52 MMHG | WEIGHT: 236.31 LBS

## 2017-08-23 DIAGNOSIS — I50.30 CONGESTIVE HEART FAILURE WITH LV DIASTOLIC DYSFUNCTION, NYHA CLASS 2: Primary | ICD-10-CM

## 2017-08-23 DIAGNOSIS — R60.0 BILATERAL LEG EDEMA: ICD-10-CM

## 2017-08-23 DIAGNOSIS — I48.0 PAF (PAROXYSMAL ATRIAL FIBRILLATION): ICD-10-CM

## 2017-08-23 DIAGNOSIS — I35.0 NONRHEUMATIC AORTIC VALVE STENOSIS: ICD-10-CM

## 2017-08-23 PROCEDURE — 3074F SYST BP LT 130 MM HG: CPT | Mod: S$GLB,,, | Performed by: INTERNAL MEDICINE

## 2017-08-23 PROCEDURE — 99499 UNLISTED E&M SERVICE: CPT | Mod: S$GLB,,, | Performed by: INTERNAL MEDICINE

## 2017-08-23 PROCEDURE — 3008F BODY MASS INDEX DOCD: CPT | Mod: S$GLB,,, | Performed by: INTERNAL MEDICINE

## 2017-08-23 PROCEDURE — 1159F MED LIST DOCD IN RCRD: CPT | Mod: S$GLB,,, | Performed by: INTERNAL MEDICINE

## 2017-08-23 PROCEDURE — 3078F DIAST BP <80 MM HG: CPT | Mod: S$GLB,,, | Performed by: INTERNAL MEDICINE

## 2017-08-23 PROCEDURE — 99214 OFFICE O/P EST MOD 30 MIN: CPT | Mod: S$GLB,,, | Performed by: INTERNAL MEDICINE

## 2017-08-23 PROCEDURE — 1125F AMNT PAIN NOTED PAIN PRSNT: CPT | Mod: S$GLB,,, | Performed by: INTERNAL MEDICINE

## 2017-08-23 RX ORDER — NITROFURANTOIN 25; 75 MG/1; MG/1
CAPSULE ORAL
COMMUNITY
Start: 2017-07-26 | End: 2019-08-14 | Stop reason: ALTCHOICE

## 2017-08-23 NOTE — PROGRESS NOTES
Subjective:    Patient ID:  Aimee Aquino is a 67 y.o. female who presents for Congestive Heart Failure (swelling in both legs and feet)      HPI  DISCUSSED LABS, CR 1.21, , HAS BEEN HAVING MORE EDEMA,   Past Medical History:   Diagnosis Date    Anemia     will be starting iron infusions 2015    Anxiety     Aortic stenosis     Arthritis     Ascites     Atrial fibrillation     NOT SURE WHEN    Back pain, chronic     has spinal stimulator    Bladder spasms     Cancer     skin CA    CHF (congestive heart failure)     CKD (chronic kidney disease), stage III     not on dialysis    Coronary artery disease     Diabetes mellitus     Fibromyalgia     GERD (gastroesophageal reflux disease)     Gout     Heart murmur     History of urinary urgency     Hyperlipidemia     Hypertension     controlled with diuretic, sometimes hypotension    Neuropathy     Oxygen dependent     2 lpm most of the time    Restless legs syndrome     severe    Scoliosis     Shingles     Unsure of dates    Sleep apnea     CPAP, use with oxygen    Stenosis of aortic and mitral valves     Stroke     x3    Thyroid disease     hypothyroid    Venous insufficiency of leg     wears pumps on legs, boots    Vertigo     uses Antivert 3 times every day     Past Surgical History:   Procedure Laterality Date    APPENDECTOMY      CARDIAC CATHETERIZATION  2010    no stents    CATARACT EXTRACTION W/  INTRAOCULAR LENS IMPLANT Bilateral     CHOLECYSTECTOMY      EYE SURGERY      cataracts removed    GASTRIC BYPASS  2010    HYSTERECTOMY      PARTIAL HYSTERECTOMY  1980's they took one ovary only    RI EXPLORATORY OF ABDOMEN      w/ RT salpingo-oopherectomy    SHOULDER SURGERY      left shoulder    SPINAL CORD STIMULATOR IMPLANT      TONSILLECTOMY, ADENOIDECTOMY      TOTAL KNEE ARTHROPLASTY Bilateral     VASCULAR SURGERY  Sept 2014    EVLT saphenous, left leg     Family History   Problem Relation Age of Onset    Diabetes  Mother     Stroke Mother     Heart disease Mother     Heart attack Father     Heart disease Father     Heart disease Sister     Cancer Maternal Aunt     Stroke Paternal Aunt     Diabetes Paternal Aunt     Heart disease Maternal Grandfather     Cancer Maternal Grandfather     Heart attack Paternal Grandfather     Cancer Maternal Aunt      Social History     Social History    Marital status:      Spouse name: N/A    Number of children: N/A    Years of education: N/A     Social History Main Topics    Smoking status: Former Smoker     Quit date: 1/20/2010    Smokeless tobacco: Never Used    Alcohol use No    Drug use: No    Sexual activity: No     Other Topics Concern    None     Social History Narrative    None       Review of patient's allergies indicates:   Allergen Reactions    Iodinated contrast- oral and iv dye Swelling     Lip and face swelling    Adhesive tape-silicones Rash     Use paper tape    Ambien [zolpidem] Other (See Comments)     sleepwalking    Codeine Rash    Demerol [meperidine] Rash    Penicillin g Rash     abcess    Sulfasalazine Rash    Tetracycline Rash       Current Outpatient Prescriptions:     ACETAMINOPHEN (TYLENOL EXTRA STRENGTH ORAL), Take by mouth every evening., Disp: , Rfl:     allopurinol (ZYLOPRIM) 300 MG tablet, Take 1 tablet (300 mg total) by mouth once daily., Disp: 30 tablet, Rfl: 5    amitriptyline (ELAVIL) 150 MG Tab, TAKE 1 TABLET (150 MG TOTAL) BY MOUTH EVERY EVENING., Disp: , Rfl: 5    apixaban 2.5 mg Tab, Take 1 tablet (2.5 mg total) by mouth 2 (two) times daily., Disp: 180 tablet, Rfl: 1    ascorbic acid, vitamin C, (VITAMIN C) 1000 MG tablet, Take 1,000 mg by mouth once daily., Disp: , Rfl:     biotin 2,500 mcg Cap, Take 5,000 mg by mouth once daily.  , Disp: , Rfl:     bumetanide (BUMEX) 0.5 MG Tab, TAKE 1 TABLET BY MOUTH EVERY MORNING MAY TAKE AN EXTRA DOSE IN PM FOR WEIGHT GAIN >3 LBS-2 DAYS, Disp: 60 tablet, Rfl: 5     "bumetanide (BUMEX) 1 MG tablet, TAKE 1 TABLET (1 MG TOTAL) BY MOUTH 2 (TWO) TIMES DAILY., Disp: 60 tablet, Rfl: 6    cranberry 1,000 mg Cap, Take 3,600 mg by mouth.  , Disp: , Rfl:     diclofenac sodium (VOLTAREN) 1 % Gel, APPLY TOPICALLY 4 (FOUR) TIMES DAILY AS NEEDED., Disp: 100 g, Rfl: 3    estradiol (ESTRACE) 1 MG tablet, 1 mg. , Disp: , Rfl: 7    fluconazole (DIFLUCAN) 100 MG tablet, TAKE 1 TABLET (100 MG TOTAL) BY MOUTH ONCE., Disp: 5 tablet, Rfl: 3    insulin glargine (LANTUS) 100 unit/mL injection, Inject 68 Units into the skin once daily. , Disp: , Rfl:     insulin syringe-needle U-100 (BD INSULIN SYRINGE ULTRA-FINE) 1 mL 31 x 5/16" Syrg, USE 3 TIMES DAILY, Disp: 100 each, Rfl: 6    lansoprazole (PREVACID) 30 MG capsule, 1 CAP 30 MIN BEFORE BREAKFAST AND SUPPER, Disp: , Rfl: 11    levothyroxine (SYNTHROID) 100 MCG tablet, TAKE 1 TABLET (100 MCG TOTAL) BY MOUTH ONCE DAILY., Disp: 30 tablet, Rfl: 5    loratadine (CLARITIN) 10 mg tablet, Take 10 mg by mouth once daily., Disp: , Rfl:     meclizine (ANTIVERT) 12.5 mg tablet, TAKE 1 TABLET (12.5 MG TOTAL) BY MOUTH 3 (THREE) TIMES DAILY AS NEEDED., Disp: 90 tablet, Rfl: 0    mupirocin (BACTROBAN) 2 % ointment, , Disp: , Rfl:     nitrofurantoin, macrocrystal-monohydrate, (MACROBID) 100 MG capsule, , Disp: , Rfl:     oxycodone-acetaminophen (PERCOCET) 5-325 mg per tablet, Take 1 tablet by mouth daily as needed for Pain., Disp: 30 tablet, Rfl: 0    PATANOL 0.1 % ophthalmic solution, PLACE 1 DROP INTO BOTH EYES ONCE DAILY, Disp: 5 mL, Rfl: 3    pramipexole (MIRAPEX) 0.5 MG tablet, TAKE 3 TABLETS BY MOUTH IN P.M., Disp: 90 tablet, Rfl: 3    pyridoxine, vitamin B6, (VITAMIN B-6) 100 MG Tab, Take 50 mg by mouth once daily., Disp: , Rfl:     rosuvastatin (CRESTOR) 5 MG tablet, Take 1 tablet (5 mg total) by mouth once daily., Disp: 30 tablet, Rfl: 5    selenium 200 mcg Cap, Take 200 mg by mouth once daily., Disp: 30 capsule, Rfl: 5    tramadol (ULTRAM) " 50 mg tablet, TAKE 1 TABLET EVERY 4 HOURS AS NEEDED, Disp: 60 tablet, Rfl: 0    vitamin D 1000 units Tab, Take 185 mg by mouth once daily., Disp: , Rfl:     dipyridamole-aspirin 200-25 mg (AGGRENOX)  mg CM12, TAKE 1 CAPSULE BY MOUTH 2 (TWO) TIMES DAILY., Disp: 60 capsule, Rfl: 6    Review of Systems   Constitution: Positive for weight gain. Negative for chills, diaphoresis, weakness, malaise/fatigue and night sweats.   HENT: Negative for congestion, hearing loss and nosebleeds.    Eyes: Negative for blurred vision, discharge, double vision and visual disturbance.   Cardiovascular: Positive for dyspnea on exertion (MILD) and leg swelling (MILD). Negative for chest pain, claudication, cyanosis, irregular heartbeat, near-syncope, orthopnea, palpitations, paroxysmal nocturnal dyspnea and syncope.   Respiratory: Negative for cough, hemoptysis, sleep disturbances due to breathing, snoring, sputum production and wheezing.    Endocrine: Negative for cold intolerance and heat intolerance.        BG FLUCTUATES   Hematologic/Lymphatic: Negative for adenopathy and bleeding problem. Does not bruise/bleed easily.   Skin: Negative for color change, itching and nail changes.   Musculoskeletal: Positive for arthritis. Negative for falls and stiffness. Back pain: CHRONIC.   Gastrointestinal: Negative for abdominal pain, change in bowel habit, constipation, dysphagia, heartburn, hematemesis, jaundice, melena and vomiting.        INCREASED ABD GIRTH   Genitourinary: Negative for dysuria, flank pain and frequency.   Neurological: Negative for brief paralysis, difficulty with concentration, disturbances in coordination, dizziness, focal weakness, light-headedness, loss of balance, numbness, paresthesias, seizures, sensory change and tremors.   Psychiatric/Behavioral: Negative for altered mental status, depression, memory loss and substance abuse. The patient is not nervous/anxious.    Allergic/Immunologic: Negative for persistent  "infections.        Objective:      Vitals:    08/23/17 1457   BP: (!) 102/52   Pulse: 81   SpO2: (!) 94%   Weight: 107.2 kg (236 lb 5.3 oz)   Height: 5' 7" (1.702 m)   PainSc:   6   PainLoc: Ankle     Body mass index is 37.02 kg/m².    Physical Exam   Constitutional: She is oriented to person, place, and time. She appears well-developed and well-nourished. She is active.   OBESE, OLDER LOOKING   HENT:   Head: Normocephalic and atraumatic.   Mouth/Throat: Oropharynx is clear and moist and mucous membranes are normal.   Eyes: Conjunctivae and EOM are normal. Pupils are equal, round, and reactive to light.   Neck: Trachea normal and normal range of motion. Neck supple. Normal carotid pulses, no hepatojugular reflux and no JVD present. Carotid bruit is not present. No tracheal deviation, no edema and no erythema present. No thyromegaly present.   Cardiovascular: Normal rate and regular rhythm.   No extrasystoles are present. PMI is not displaced.  Exam reveals no gallop and no friction rub.    Murmur heard.   Systolic murmur is present with a grade of 1/6  at the upper right sternal border  Pulses:       Carotid pulses are 2+ on the right side, and 2+ on the left side.       Radial pulses are 2+ on the right side, and 2+ on the left side.        Femoral pulses are 2+ on the right side, and 2+ on the left side.       Dorsalis pedis pulses are 2+ on the right side, and 2+ on the left side.        Posterior tibial pulses are 2+ on the right side, and 2+ on the left side.   TRACE EDEMA   Pulmonary/Chest: Effort normal and breath sounds normal. No tachypnea and no bradypnea. She has no wheezes. She has no rales. She exhibits no tenderness.   Abdominal: Soft. Bowel sounds are normal. She exhibits no distension and no mass. There is no hepatosplenomegaly. There is no tenderness. There is no guarding and no CVA tenderness.   Musculoskeletal: Normal range of motion. She exhibits no edema or tenderness.   SLOW GAIT, WALKER "   Lymphadenopathy:     She has no cervical adenopathy.   Neurological: She is alert and oriented to person, place, and time. She has normal strength. She displays no tremor. No cranial nerve deficit. She exhibits normal muscle tone. Coordination normal.   Skin: Skin is warm and dry. No rash noted. No cyanosis or erythema. No pallor.   Psychiatric: She has a normal mood and affect. Her speech is normal and behavior is normal. Judgment and thought content normal.               ..    Chemistry        Component Value Date/Time     10/25/2016 1253    K 3.9 10/25/2016 1253    CL 98 10/25/2016 1253    CO2 33 (H) 10/25/2016 1253    BUN 43 (H) 10/25/2016 1253    CREATININE 1.25 10/25/2016 1253     10/25/2016 1253        Component Value Date/Time    CALCIUM 8.2 (L) 10/25/2016 1253    ALKPHOS 63 10/25/2016 1253    AST 25 10/25/2016 1253    ALT 40 10/25/2016 1253    BILITOT 0.4 10/25/2016 1253    ESTGFRAFRICA 51 (A) 10/25/2016 1253    EGFRNONAA 44 (A) 10/25/2016 1253            ..  Lab Results   Component Value Date    CHOL 123 08/18/2016     Lab Results   Component Value Date    HDL 33 (L) 08/18/2016     Lab Results   Component Value Date    LDLCALC 54.4 (L) 08/18/2016     Lab Results   Component Value Date    TRIG 178 (H) 08/18/2016     Lab Results   Component Value Date    CHOLHDL 26.8 08/18/2016     ..  Lab Results   Component Value Date    WBC 10.38 10/25/2016    HGB 10.0 (L) 10/25/2016    HCT 32.5 (L) 10/25/2016     (H) 10/25/2016     10/25/2016       Test(s) Reviewed  I have reviewed the following in detail:  [] Stress test   [] Angiography   [] Echocardiogram   [x] Labs   [x] Other:       Assessment:         ICD-10-CM ICD-9-CM   1. Congestive heart failure with LV diastolic dysfunction, NYHA class 2 I50.30 428.30     428.0   2. Nonrheumatic aortic valve stenosis I35.0 424.1   3. PAF (paroxysmal atrial fibrillation) I48.0 427.31   4. Obesity, Class II, BMI 35-39.9, with comorbidity E66.9 278.00    5. Bilateral leg edema R60.0 782.3     Problem List Items Addressed This Visit        Cardiac/Vascular    Congestive heart failure with LV diastolic dysfunction, NYHA class 2 - Primary    Relevant Orders    2D echo with color flow doppler    PAF (paroxysmal atrial fibrillation)    Nonrheumatic aortic valve stenosis    Relevant Orders    2D echo with color flow doppler       Endocrine    Obesity, Class II, BMI 35-39.9, with comorbidity       Other    Bilateral leg edema    Relevant Orders    Cardiology Lab US Lower Extremity Veins Bilateral      Other Visit Diagnoses    None.          Plan:     CHECK ECHO, LAST 8/2016, STRESS TEST LOW RISK 8/2016, WILL RE-EVALUATED AS, EF, PART OF THE PROBLEM, DECREASED MOBILITY, ALL OTHER CV CLINICALLY STABLE, NO ANGINA, NO OVERT HF, NO TIA, NO CLINICAL ARRHYTHMIA,CONTINUE CURRENT MEDS, EDUCATION, DIET, EXERCISE, WEIGHT LOSS, ON ? MEDS PER DR EDGAR, WILL CALL [facility]      Congestive heart failure with LV diastolic dysfunction, NYHA class 2  -     2D echo with color flow doppler; Future    Nonrheumatic aortic valve stenosis  -     2D echo with color flow doppler; Future    PAF (paroxysmal atrial fibrillation)    Obesity, Class II, BMI 35-39.9, with comorbidity    Bilateral leg edema  -     Cardiology Lab US Lower Extremity Veins Bilateral; Future    Other orders  -     apixaban 2.5 mg Tab; Take 1 tablet (2.5 mg total) by mouth 2 (two) times daily.  Dispense: 180 tablet; Refill: 1    RTC Low level/low impact aerobic exercise 5x's/wk. Heart healthy diet and risk factor modification.    See labs and med orders.    Aerobic exercise 5x's/wk. Heart healthy diet and risk factor modification.    See labs and med orders.

## 2017-08-23 NOTE — PATIENT INSTRUCTIONS
About Arrhythmias    Electrical impulses cause the normal heart to beat 60 to 100 times a minute while at rest. These impulses come from a natural pacemaker deep inside the heart muscle. Each impulse causes the heart muscle to contract. This causes the blood to flow through the heart and out to the tissues and organs of your body.  An arrhythmia is a change from the normal speed or pattern of these electrical impulses. This can cause the heart to beat too fast (tachycardia); or too slow (bradycardia); or in an unsteady pattern (irregular rhythm).  Symptoms of arrhythmias  Different people experience arrhythmias differently. Sometimes they may not have symptoms, but just notice a change in their pulse. Symptoms can include:  · Fluttering feeling in the chest  · Shortness of breath  · Chest pain or pressure  · Neck fullness  · Lightheadedness or dizziness  · Fainting or almost fainting  · Palpitations (the sense that your heart is fluttering or beating fast or hard or irregularly)  · Tiredness, fatigue, or weakness  · Cardiac arrest  Causes of arrhythmias  Arrhythmias are most often due to heart disease such as:  · Coronary artery disease  · Heart valve disease  · Enlarged heart  · High blood pressure  · Heart failure  Other causes of  arrhythmia include:  · Certain medicines (such as asthma inhalers and decongestants)  · Some herbal supplements  · Cardiac stimulant drugs (such as cocaine, amphetamine, diet pills, certain decongestant cold medicines, caffeine, and nicotine)  · Excessive alcohol use  · Anxiety and panic disorder  · Thyroid disease  · Anemia  · Diabetes  · Sleep apnea  · Obesity  · Congenital heart disease  · Cardiac genetic diseases  Arrhythmias can often be prevented. The cause and type of arrhythmia determines the best treatment. Sometimes your doctor may want to monitor your heart rate over a 24-hour period or longer. This can help identify the cause of your arrhythmia and find the best treatment.  This can be done with a Holter monitor, a portable EKG recording device attached by wires to your chest. Or you may get an event monitor, which you can place over the skin in front of your heart to record heart rhythms. You can carry this with you as you go about your routine activities during the monitoring period. Implantable loop recorders may also be used to monitor the heart rhythm for up to 2 years. This miniature device is placed underneath the skin overlying the heart.  Home care  The following guidelines will help you care for yourself at home:  · Avoid cardiac stimulants (such as cocaine, amphetamine, diet pills, certain decongestant cold medicines, caffeine, and nicotine).  · If you smoke, stop smoking. Contact your doctor or a local stop-smoking program for help.  · Tell your doctor about any prescription, over-the-counter, or herbal medicines you take. These may be affecting your heart rhythm.  Follow-up care  Follow up with your healthcare provider, or as advised. If a Holter monitor has been recommended, contact the cardiologist you have been referred to as soon as you can  the device. Other outpatient tests may also be arranged for you at that time.  Call 911  This is the fastest and safest way to get to the emergency department. The paramedics can also start treatment on the way to the hospital, if needed.  Don't wait until your symptoms are severe to call 911. Other reasons to call 911 besides chest pain include:  · Chest, shoulder, arm, neck, or back pain  · Shortness of breath  · Feeling lightheaded, faint, or dizzy  · Unexplained fainting  · Rapid heart beat  · Slower than usual heart rate compared to your normal  · Very irregular heartbeat  · Chest pain (angina) with weakness, dizziness, heavy sweating, nausea, or vomiting  · Extreme drowsiness, or confusion  · Weakness of an arm or leg or one side of the face  · Difficulty with speech or vision  When to seek medical advice  Remember,  "things are not always like they are on TV. Sometimes it is not so obvious. You may only feel weak or just "not right." If it is not clear or if you have any doubt, call for advice.  · Seek help for chest pain, or it feels different from usual, even if your symptoms are mild.  · Don't drive yourself. Have someone else drive. If no one can drive you, call 911.  · If your doctor has given you medicines to take when you have symptoms, take them, but do not delay getting help while trying to find them.  Date Last Reviewed: 4/25/2016 © 2000-2016 [employer]. [address] [place] [address], [place] [address]. All rights reserved. This information is not intended as a substitute for professional medical care. Always follow your healthcare professional's instructions.        Understanding Atrial Fibrillation    An arrhythmia is any problem with the speed or pattern of the heartbeat. Atrial fibrillation (AFib) is a common type of arrhythmia. It causes fast, chaotic electrical signals in the atria. This leads to poor functioning of the heart. It also affects how much blood your heart can pump out to the body.  Afib may occur once in a while and go away on its own. Or it may continue for longer periods and need treatment.  AFib can lead to serious problems, such as stroke. Your healthcare provider will need to monitor and manage it.  What happens during atrial fibrillation?   The heart has an electrical system that sends signals to control the heartbeat. As signals move through the heart, they tell the hearts upper chambers (atria) and lower chambers (ventricles) when to squeeze (contract) and relax. This lets blood move through the heart and out to the body and lungs.  With AFib, the atria receive abnormal signals. This causes them to contract in a fast and irregular way, and out of sync with the ventricles. When this happens, the atria also have a harder time moving blood into the ventricles. Blood may then pool in " the atria, which increases the risk for blood clots and stroke. The ventricles also may contract too quickly and irregularly. As a result, they may not pump blood to the body and lungs as well as they should. This can weaken the heart muscle over time and cause heart failure.  What causes atrial fibrillation?  AFib is more common in older adults. It has many possible causes including:  · Coronary artery disease  · Heart valve disease  · Heart attack  · Heart surgery  · High blood pressure  · Thyroid disease  · Diabetes  · Lung disease  · Sleep apnea  · Heavy alcohol use  In some cases of AFib, doctors do not know the cause.  What are the symptoms of atrial fibrillation?  AFib may or may not cause symptoms. If symptoms do occur, they may include:  · A fast, pounding, irregular heartbeat  · Shortness of breath  · Tiredness  · Dizziness or fainting  · Chest pain  How is atrial fibrillation treated?  Treatments for AFib can include any of the options below.  · Medicines. You may be prescribed:  ¨ Heart rate medicines to help slow down the heartbeat  ¨ Heart rhythm medicines to help the heart beat more regularly  ¨ Anti-clotting medicines to help reduce the risk for blood clots and stroke  · Electrical cardioversion. Your healthcare provider uses special pads or paddles to send one or more brief electrical shocks to the heart. This can help reset the heartbeat to normal.  · Ablation. Long, thin tubes called catheters are threaded through a blood vessel to the heart. There, the catheters send out hot or cold energy to the areas causing the abnormal signals. This energy destroys the problem tissue or cells. This improves the chances that your heart will stay in normal rhythm without using medicines. If your heart rate and rhythm cant be controlled, you may need ablation and a pacemaker. These will help control the heart rate and regularity of the heartbeat.  · Surgery. During surgery, your healthcare provider may use  different methods to create scar tissue in the areas of the heart causing the abnormal signals. The scar tissue disrupts the abnormal signals and may stop AFib from occurring.  What are the complications of atrial fibrillation?  These can include:  · Blood clots  · Stroke  · Heart failure. This problem occurs when the heart muscle weakens so much that it can no longer pump blood well.  When should I call my healthcare provider?  Call your healthcare provider right away if you have any of these:  · Symptoms that dont get better with treatment, or get worse  · New symptoms   Date Last Reviewed: 5/1/2016  © 9950-1833 [employer]. [address] [place] [address], [place] [address]. All rights reserved. This information is not intended as a substitute for professional medical care. Always follow your healthcare professional's instructions.        Eating Heart-Healthy Foods  Eating has a big impact on your heart health. In fact, eating healthier can improve several of your heart risks at once. For instance, it helps you manage weight, cholesterol, and blood pressure. Here are ideas to help you make heart-healthy changes without giving up all the foods and flavors you love.  Getting started  · Talk with your health care provider about eating plans, such as the DASH or Mediterranean diet. You may also be referred to a dietitian.  · Change a few things at a time. Give yourself time to get used to a few eating changes before adding more.  · Work to create a tasty, healthy eating plan that you can stick to for the rest of your life.    Goals for healthy eating  Below are some tips to improve your eating habits:  · Limit saturated fats and trans fats. Saturated fats raise your levels of cholesterol, so keep these fats to a minimum. They are found in foods such as fatty meats, whole milk, cheese, and palm and coconut oils. Avoid trans fats because they lower good cholesterol as well as raise bad cholesterol. Trans fats are  most often found in processed foods.  · Reduce sodium (salt) intake. Eating too much salt may increase your blood pressure. Limit your sodium intake to 2,300 milligrams (mg) per day, or less if your health care provider recommends it. Dining out less often and eating fewer processed foods are two great ways to decrease the amount of salt you consume.  · Managing calories. A calorie is a unit of energy. Your body burns calories for fuel, but if you eat more calories than your body burns, the extras are stored as fat. Your health care provider can help you create a diet plan to manage your calories. This will likely include eating healthier foods as well as exercising regularly. To help you track your progress, keep a diary to record what you eat and how often you exercise.  Choose the right foods  Aim to make these foods staples of your diet. If you have diabetes, you may have different recommendations than what is listed here:  · Fruits and vegetable provide plenty of nutrients without a lot of calories. At meals, fill half your plate with these foods. Split the other half of your plate between whole grains and lean protein.  · Whole grains are high in fiber and rich in vitamins and nutrients. Good choices include whole-wheat bread, pasta, and brown rice.  · Lean proteins give you nutrition with less fat. Good choices include fish, skinless chicken, and beans.  · Low-fat or nonfat dairy provides nutrients without a lot of fat. Try low-fat or nonfat milk, cheese, or yogurt.  · Healthy fats can be good for you in small amounts. These are unsaturated fats, such as olive oil, nuts, and fish. Try to have at least 2 servings per week of fatty fish such as salmon, sardines, mackerel, rainbow trout, and albacore tuna. These contain omega-3 fatty acids, which are good for your heart. Flaxseed is another source of a heart-healthy fat.  More on heart healthy eating    Read food labels  Healthy eating starts at the grocery  store. Be sure to pay attention to food labels on packaged foods. Look for products that are high in fiber and protein, and low in saturated fat, cholesterol, and sodium. Avoid products that contain trans fat. And pay close attention to serving size. For instance, if you plan to eat two servings, double all the numbers on the label.  Prepare food right  A key part of healthy cooking is cutting down on added fat and salt. Look on the internet for lower-fat, lower-sodium recipes. Also, try these tips:  · Remove fat from meat and skin from poultry before cooking.  · Skim fat from the surface of soups and sauces.  · Broil, boil, bake, steam, grill, and microwave food without added fats.  · Choose ingredients that spice up your food without adding calories, fat, or sodium. Try these items: horseradish, hot sauce, lemon, mustard, nonfat salad dressings, and vinegar. For salt-free herbs and spices, try basil, cilantro, cinnamon, pepper, and rosemary.  Date Last Reviewed: 6/25/2015  © 6834-5482 [employer]. [address] [place] [address]. All rights reserved. This information is not intended as a substitute for professional medical care. Always follow your healthcare professional's instructions.        Heart Disease Education    The heart beats 60 to 100 times per minute, 24 hours a day. This equals almost 1000,000 times a day. It pumps blood with oxygen and nutrients to the tissues and organs of the body. But the heart is a muscle and needs its own supply of blood. Blood flow to the heart is supplied by the coronary arteries. Coronary artery disease (atherosclerosis) is a result of cholesterol, saturated fat, and calcium deposits (plaques) that build up inside the walls. This causes inflammation within the coronary arteries. These plaques narrow the artery and reduce blood flow to the heart muscle. The reduction in blood flow to the heart muscle decreases oxygen supply to the heart. If the  narrowing is significant enough, the oxygen supply to one or more regions of the heart can be temporarily or permanently shut down. This can cause chest pain, and possibly death of heart tissue (heart attack).  Types of chest pain  Angina is the name for pain in the heart muscle. Angina is a warning sign of serious heart disease. When untreated it can lead to a heart attack, also known as acute myocardial infarction, or AMI. Angina occurs when there is not enough blood and oxygen flowing to the heart for the amount of work it is doing. This most often happens during physical exertion, when the heart is working hardest. It is usually relieved by rest or nitroglycerin. Angina may also occur after a large meal when extra blood is sent to the digestive organs and less goes to the heart. In the case of advanced or unstable heart disease, angina can occur at rest or awaken you from sleep. Angina usually lasts from a few minutes up to 20 minutes or more. When treated early, the effects of angina can be reversed without permanent damage to the heart. Angina is a serious condition and needs to be evaluated by a medical professional immediately.  There are two types of angina -- stable and unstable:  · Stable angina usually occurs with a predictable level of activity. Being stable, its character, severity, and occurrence do not change much over time. It usually starts with activity, and resolves with rest or taking your medicine as instructed by your doctor. The symptoms usually do not last long.  · Unstable angina changes or gets worse over time. It is different from whatever you are used to. It may feel different or worse, begin without cause, occur with exercise or exertion, wake you up from sleep, and last longer. It may not respond in the same way as it does when you take your usual medicines for an attack. This type of angina can be a warning sign of an impending heart attack.     A heart attack is usually the result of  "a blood clot that suddenly forms in a coronary artery that has been narrowed with plaque. When this occurs, blood flow may be cut off to a part of the heart muscle, causing the cells to die. This weakens the pumping action of the heart, which affects the delivery of blood to all the other organs in the body including the brain. This damage is not reversible. However, early treatment can limit the amount of damage.  The pain you feel with angina and a heart attack may have a similar quality. However, it is usually different in intensity and duration. Here are some typical descriptions of a heart attack:  · It is most often experienced as a squeezing, crushing, pressure-like sensation in the center of the chest.  · It is sometimes described as something heavy sitting on my chest.  · It may feel more like a bad case of indigestion.  · The pain may spread from the chest to the arm, shoulder, throat or jaw.  · Sometimes the pain is not felt in the chest at all, but only in the arm, shoulder, throat or jaw.  · There may also be nausea, vomiting, dizziness or light-headedness, sweating and trouble breathing.  · Palpitations, or your heart beating rapidly  · A new, irregular heart beat  · Unexplained weakness  You may not be able to tell the difference between "bad" angina and a heart attack at home. Seek help if your symptoms are different than usual. Do not be in denial or just try to "tough it out."  Call 911  This is the fastest and safest way to get to the emergency department. The paramedics can also start treatment on the way to the hospital, saving valuable time for your heart.  · If the angina gets worse, if it continues, or if it stops and returns, call 911 immediately. Do not delay. You may be having a heart attack.  · After you call 911, take a second tablet or spray unless instructed otherwise. When repeating doses, sit down if possible, because it can make you feel lightheaded or dizzy. Wait another 5 " minutes. If the angina still does not go away, take a third tablet or spray. Do not take more than 3 tablets or sprays within 15 minutes. Stay on the phone with 911 for further instruction.  · Your healthcare provider may give you slightly different instructions than those above. If so, follow them carefully.  Do not wait until symptoms become severe to call 911.  Other reasons to call 911 include:  · Trouble breathing  · Feeling lightheaded, faint, or dizzy  · Rapid heart beat  · Slower than usual heart rate compared to your normal  · Angina with weakness, dizziness, fainting, heavy sweating, nausea, or vomiting  · Extreme drowsiness, confusion  · Weakness of an arm or leg or one side of the face  · Difficulty with speech or vision  When to seek medical care  Remember, the signs and symptoms of a heart attack are not always like they are on TV. Sometimes they are not so obvious. You may only feel weak, or just not right. If it is not clear or if you have any doubt, call for advice.  · Seek help if there is a change in the type of pain, if it feels different, or if your symptoms are mild.  · Do not drive yourself. Have someone else drive you. If no one can drive, call 911.  · Do not delay. Fast diagnosis and treatment can prevent or limit the amount of heart damage during a heart attack.  · Do not go to your doctor's office or a clinic as they may not be able to provide all the testing and treatment required for this condition.  · If your doctor has given you medicine to take when symptoms occur, take them but don't delay getting help trying to locate medicines.  What happens in the emergency department  The emergency department is connected to your local emergency medical system (EMS) through 911. That's why during a cardiac emergency, calling 911 is the fastest way to get help. The goal of the emergency department is to rapidly screen, evaluate, and treat people.  Once you are there, an electrocardiogram (ECG or  "heart tracing) will be done. Blood samples may be taken to look for the presence of heart enzymes that leak from damaged heart cells and show if a heart attack is occurring. You will often be evaluated by a heart specialist (cardiologist) who decides the best course of action. In the case of severe angina or early heart attack, and depending on the circumstances, powerful "clot busting" medicines can be used to dissolve blood clots in the coronary artery. In other cases, you may be taken to a cardiac catheterization lab. Here, a tiny balloon-tipped catheter is advanced through blood vessels to the heart. There the balloon is inflated pushing open the blood vessel restoring blood flow.  Risk factors for heart disease  Risk factors for heart disease are a combination of genetic and lifestyle. Many risk factors work by either directly or indirectly damaging the blood vessels of the heart, or by increasing the risk of forming blood or cholesterol clots, which then clog up and block the arteries.     Examples of physical lifestyle risk factors:  · Cigarette smoking  · High blood pressure  · High blood cholesterol  · Use of stimulant drugs such as cocaine, crack, and amphetamines  · Eating a high-fat, high-cholesterol meal  · Diabetes   · Obesity which increases risk for diabetes and high blood pressure  · Lack of regular physical activity     Examples of emotional lifestyle factors:  · Chronic high stress levels release stress hormones. These raise blood pressure and cholesterol level and makes blood clot more easily.  · Held-in anger, hostile or cynical attitude  · Social and emotional isolation, lack of intimacy  · Loss of relationship  · Depression  Other factors that increase the risk of heart attack that you cannot control :  · Age. The older you get beyond 40, the greater is your risk of significant coronary artery disease.  · Gender. More men than women get heart disease; but once past menopause, women who are " not taking estrogen replacement have the same risk as men for a heart attack.  · Family history. If your mother, father, brother or sister has coronary artery disease, your risk of having it is higher than a person your age without this family history.  What can you do to decrease your risk  To reduce your risk of heart disease:  · Get regular checkups with your doctor.  · Take your medicines for blood pressure, cholesterol or diabetes as directed.  · Watch your diet. Eat a heart healthy diet choosing fresh foods, less salt, cholesterol, and fat  · Stop smoking. Get help if needed.  · Get regular exercise.  · Manage stress.  · Carry a list of medicines and doses in your wallet.  Date Last Reviewed: 12/30/2015 © 2000-2016 [employer]. [address] [place] [address], [place] [address]. All rights reserved. This information is not intended as a substitute for professional medical care. Always follow your healthcare professional's instructions.        What is Heart Failure?  The heart is a muscle that pumps oxygen-rich blood to all parts of the body. When you have heart failure, the heart is not able to pump as well as it should. Blood and fluid may back up into the lungs, and some parts of the body dont get enough oxygen-rich blood to work normally. These problems lead to the symptoms you feel.  When you have heart failure  With heart failure, not enough oxygen-rich blood leaves the heart with each beat. There are 2 types of heart failure. Both affect the ventricles ability to pump blood. You may have 1 or both types.       Systolic heart failure. The heart muscle becomes weak and enlarged. It cant pump enough oxygen-rich blood forward to the rest of the body when the ventricles contract. The measurement of how much blood your heart pushes out when it beats is called ejection fraction. In systolic heart failure, the ejection fraction is lower than normal. This can cause blood to back up into the lungs and  cause shortness of breath and eventually ankle swelling (edema).  This is also called heart failure with reduced ejection fraction, or  HFrEF.    Diastolic heart failure. The heart muscle becomes stiff. It doesnt relax normally between contractions, which keeps the ventricles from filling with blood. Ejection fraction is often in the normal range. This can still lead to the backup of blood into the body and affect the organs such as the liver. This is also called heart failure with preserved ejection fraction or HFpEF.     Recognizing heart failure symptoms  When you have heart failure, you need to pay close attention to your body and how you feel, every single day. That way, if a problem occurs, you can get help before it becomes too severe. You'll need to watch for changes in your symptoms. As long as symptoms stay about the same from one day to the next, your heart failure is stable. But if symptoms start to get worse, it's time to take action.  Signs and symptoms of worsening heart failure include:  · Rapid weight gain  · Shortness of breath  · Swelling (edema)  · Fatigue  How heart failure affects your body  When the heart doesn't pump enough blood, hormones (body chemicals) are sent to increase the amount of work the heart does. Some hormones make the heart grow larger. Others tell the heart to pump faster. As a result, the heart may pump more blood at first, but it can't keep up with the ongoing demands. So, the heart muscle becomes even more weak. Over time, even less blood is pumped through the heart. This leads to problems throughout the body as organs began to feel the effects of a long-term lack of oxygen. Eventually, if untreated, this can cause problems with your lungs, liver, kidneys, and loss of elasticity in the skin, and skin changes in the lower legs. A weak heart itself can eventually cause a severe decline in health and possible death if left untreated.  What is ejection fraction?  Ejection  fraction (EF) measures how much blood the heart pumps out (ejects). This is measured to help diagnose heart failure. A healthy heart pumps at least half of the blood from the ventricles with each beat. This means a normal ejection fraction is around 50% to 70%. Your doctor will calculate ejection fraction from an echocardiogram.     My ejection fraction     Date: ______________________  Ejection fraction: _____________  Test used: __________________  Date Last Reviewed: 3/15/2016  © 3225-4990 [employer]. [address] [place] [address], [place] [address]. All rights reserved. This information is not intended as a substitute for professional medical care. Always follow your healthcare professional's instructions.        Leg Swelling in Both Legs    Swelling of the feet, ankles, and legs is called edema. It is caused by excess fluid that has collected in the tissues. Extra fluid in the body settles in the lowest part because of gravity. This is why the legs and feet are most affected.  Some of the causes for edema include:  · Disease of the heart like congestive heart failure  · Standing or sitting for long periods of time  · Infection of the feet or legs  · Blood pooling in the veins of your legs (venous insufficiency)  · Dilated veins in your lower leg (varicose veins)  · Garters or other clothing that is tight on your legs. This will cause blood to pool in your legs because the clothing limits blood flow.  · Some medicines such as hormones like birth control pills, some blood pressure medicines like calcium channel blockers (amlodipine) and steroids, some antidepressants like MAO inhibitors and tricyclics  · Menstrual periods that cause you to retain fluids  · Many types of renal disease  · Liver failure or cirrhosis  · Pregnancy, some swelling is normal, but a sudden increase in leg swelling or weight gain can be a sign of a dangerous complication of pregnancy  · Poor nutrition  · Thyroid disease  Medical  treatment will depend on what is causing the swelling in your legs. Your healthcare provider may prescribe water pills (diuretics) to get rid of the extra fluid.  Home care  Follow these guidelines when caring for yourself at home:  · Don't wear clothing like garters that is tight on your legs.  · Keep your legs up while lying or sitting.  · If infection, injury, or recent surgery is causing the swelling, stay off your legs as much as possible until symptoms get better.  · If your healthcare provider says that your leg swelling is caused by venous insufficiency or varicose veins, don't sit or  one place for long periods of time. Take breaks and walk about every few hours. Brisk walking is a good exercise. It helps circulate the blood that has collected in your leg. Talk with your provider about using support stockings to stop daytime leg swelling.  · If your provider says that heart disease is causing your leg swelling, follow a low-salt diet to stop extra fluid from staying in your body. You may also need medicine.  Follow-up care  Follow up with your healthcare provider, or as advised.  When to seek medical advice  Call your healthcare provider right away if any of these occur:  · New shortness of breath or chest pain  · Shortness of breath or chest pain that gets worse  · Swelling in both legs or ankles that gets worse  · Swelling of the abdomen  · Redness, warmth, or swelling in one leg  · Fever of 100.4ºF (38ºC) or higher, or as directed by your healthcare provider  · Yellow color to your skin or eyes  · Rapid, unexplained weight gain  · Having to sleep upright or use an increased number of pillows  Date Last Reviewed: 3/31/2016  © 4176-0919 [employer]. [address] [place] [address], [place] [address]. All rights reserved. This information is not intended as a substitute for professional medical care. Always follow your healthcare professional's instructions.        Weight Management: Getting  Started  Healthy bodies come in all shapes and sizes. Not all bodies are made to be thin. For some people, a healthy weight is higher than the average weight listed on weight charts. Your healthcare provider can help you decide on a healthy weight for you.    Reasons to lose weight  Losing weight can help with some health problems, such as high blood pressure, heart disease, diabetes, sleep apnea, and arthritis. You may also feel more energy.  Set your long-term goal  Your goal doesn't even have to be a specific weight. You may decide on a fitness goal (such as being able to walk 10 miles a week), or a health goal (such as lowering your blood pressure). Choose a goal that is measurable and reasonable, so you know when you've reached it. A goal of reaching a BMI of less than 25 is not always reasonable (or possible).   Make an action plan  Habits dont change overnight. Setting your goals too high can leave you feeling discouraged if you cant reach them. Be realistic. Choose one or two small changes you can make now. Set an action plan for how you are going to make these changes. When you can stick to this plan, keep making a few more small changes. Taking small steps will help you stay on the path to success.  Track your progress  Write down your goals. Then, keep a daily record of your progress. Write down what you eat and how active you are. This record lets you look back on how much youve done. It may also help when youre feeling frustrated. Reward yourself for success. Even if you dont reach every goal, give yourself credit for what you do get done.  Get support  Encouragement from others can help make losing weight easier. Ask your family members and friends for support. They may even want to join you. Also look to your healthcare provider, registered dietitian, and  for help. Your local hospital can give you more information about nutrition, exercise, and weight loss.  Date Last  Reviewed: 1/31/2016  © 6296-9541 [employer]. [address] [place] [address], [place] [address]. All rights reserved. This information is not intended as a substitute for professional medical care. Always follow your healthcare professional's instructions.

## 2017-08-24 NOTE — PROGRESS NOTES
Patient, Aimee Aquino (MRN #[id number]), presented with a recorded BMI of 37.02 kg/m^2 and a documented comorbidity(s):  - Atrial Fibrillation  - Atrial Fibrillation  to which the severe obesity is a contributing factor. This is consistent with the definition of severe obesity (BMI 35.0-35.9) with comorbidity (ICD-10 E66.01, Z68.35). The patient's severe obesity was monitored, evaluated, addressed and/or treated. This addendum to the medical record is made on 08/24/2017.

## 2017-10-23 ENCOUNTER — CLINICAL SUPPORT (OUTPATIENT)
Dept: CARDIOLOGY | Facility: CLINIC | Age: 67
End: 2017-10-23
Payer: MEDICARE

## 2017-10-23 DIAGNOSIS — I50.30 CONGESTIVE HEART FAILURE WITH LV DIASTOLIC DYSFUNCTION, NYHA CLASS 2: ICD-10-CM

## 2017-10-23 DIAGNOSIS — R60.0 BILATERAL LEG EDEMA: ICD-10-CM

## 2017-10-23 DIAGNOSIS — I35.0 NONRHEUMATIC AORTIC VALVE STENOSIS: ICD-10-CM

## 2017-10-23 LAB
AORTIC VALVE STENOSIS: ABNORMAL
ESTIMATED PA SYSTOLIC PRESSURE: 43.76
RETIRED EF AND QEF - SEE NOTES: 75 (ref 55–65)
TRICUSPID VALVE REGURGITATION: ABNORMAL

## 2017-10-23 PROCEDURE — 93970 EXTREMITY STUDY: CPT | Mod: S$GLB,,, | Performed by: INTERNAL MEDICINE

## 2017-10-23 PROCEDURE — 93306 TTE W/DOPPLER COMPLETE: CPT | Mod: S$GLB,,, | Performed by: INTERNAL MEDICINE

## 2017-10-24 ENCOUNTER — TELEPHONE (OUTPATIENT)
Dept: ADMINISTRATIVE | Facility: HOSPITAL | Age: 67
End: 2017-10-24

## 2017-10-24 ENCOUNTER — PATIENT OUTREACH (OUTPATIENT)
Dept: ADMINISTRATIVE | Facility: HOSPITAL | Age: 67
End: 2017-10-24

## 2017-10-24 NOTE — TELEPHONE ENCOUNTER
ANUJA,  Removed Dr. John as PCP.  Spoke to patient.  Changed pcp external.  Chart has been updated.

## 2017-11-24 PROBLEM — I70.0 THORACIC AORTA ATHEROSCLEROSIS: Status: ACTIVE | Noted: 2017-11-24

## 2017-11-24 PROBLEM — I77.1 TORTUOUS AORTA: Status: ACTIVE | Noted: 2017-11-24

## 2017-11-24 PROBLEM — E66.811 OBESITY (BMI 30.0-34.9): Status: RESOLVED | Noted: 2017-05-09 | Resolved: 2017-11-24

## 2017-11-24 PROBLEM — E66.9 OBESITY (BMI 30.0-34.9): Status: RESOLVED | Noted: 2017-05-09 | Resolved: 2017-11-24

## 2017-11-24 PROBLEM — E66.01 SEVERE OBESITY (BMI 35.0-35.9 WITH COMORBIDITY): Status: ACTIVE | Noted: 2017-08-23

## 2017-12-18 ENCOUNTER — OFFICE VISIT (OUTPATIENT)
Dept: FAMILY MEDICINE | Facility: CLINIC | Age: 67
End: 2017-12-18
Payer: MEDICARE

## 2017-12-18 VITALS
BODY MASS INDEX: 37.2 KG/M2 | WEIGHT: 237 LBS | HEART RATE: 65 BPM | DIASTOLIC BLOOD PRESSURE: 69 MMHG | HEIGHT: 67 IN | SYSTOLIC BLOOD PRESSURE: 143 MMHG

## 2017-12-18 DIAGNOSIS — Z86.73 HISTORY OF CVA (CEREBROVASCULAR ACCIDENT): ICD-10-CM

## 2017-12-18 DIAGNOSIS — E66.01 SEVERE OBESITY (BMI 35.0-35.9 WITH COMORBIDITY): ICD-10-CM

## 2017-12-18 DIAGNOSIS — I77.1 TORTUOUS AORTA: ICD-10-CM

## 2017-12-18 DIAGNOSIS — K21.9 GASTROESOPHAGEAL REFLUX DISEASE, ESOPHAGITIS PRESENCE NOT SPECIFIED: ICD-10-CM

## 2017-12-18 DIAGNOSIS — I35.0 NONRHEUMATIC AORTIC VALVE STENOSIS: ICD-10-CM

## 2017-12-18 DIAGNOSIS — G89.29 CHRONIC BACK PAIN, UNSPECIFIED BACK LOCATION, UNSPECIFIED BACK PAIN LATERALITY: ICD-10-CM

## 2017-12-18 DIAGNOSIS — Z79.4 TYPE 2 DIABETES MELLITUS WITH STAGE 3 CHRONIC KIDNEY DISEASE, WITH LONG-TERM CURRENT USE OF INSULIN: Chronic | ICD-10-CM

## 2017-12-18 DIAGNOSIS — N18.30 TYPE 2 DIABETES MELLITUS WITH STAGE 3 CHRONIC KIDNEY DISEASE, WITH LONG-TERM CURRENT USE OF INSULIN: Chronic | ICD-10-CM

## 2017-12-18 DIAGNOSIS — E11.22 TYPE 2 DIABETES MELLITUS WITH STAGE 3 CHRONIC KIDNEY DISEASE, WITH LONG-TERM CURRENT USE OF INSULIN: Chronic | ICD-10-CM

## 2017-12-18 DIAGNOSIS — I48.91 ATRIAL FIBRILLATION, UNSPECIFIED TYPE: ICD-10-CM

## 2017-12-18 DIAGNOSIS — N18.30 CKD (CHRONIC KIDNEY DISEASE) STAGE 3, GFR 30-59 ML/MIN: Chronic | ICD-10-CM

## 2017-12-18 DIAGNOSIS — G62.9 PERIPHERAL POLYNEUROPATHY: ICD-10-CM

## 2017-12-18 DIAGNOSIS — R60.0 PERIPHERAL EDEMA: ICD-10-CM

## 2017-12-18 DIAGNOSIS — I48.0 PAF (PAROXYSMAL ATRIAL FIBRILLATION): ICD-10-CM

## 2017-12-18 DIAGNOSIS — G47.33 OBSTRUCTIVE SLEEP APNEA OF ADULT: ICD-10-CM

## 2017-12-18 DIAGNOSIS — E78.5 HYPERLIPIDEMIA, UNSPECIFIED HYPERLIPIDEMIA TYPE: ICD-10-CM

## 2017-12-18 DIAGNOSIS — I50.810 RIGHT VENTRICULAR FAILURE: ICD-10-CM

## 2017-12-18 DIAGNOSIS — I50.30 CONGESTIVE HEART FAILURE WITH LV DIASTOLIC DYSFUNCTION, NYHA CLASS 2: ICD-10-CM

## 2017-12-18 DIAGNOSIS — I25.10 CORONARY ARTERY DISEASE INVOLVING NATIVE CORONARY ARTERY OF NATIVE HEART WITHOUT ANGINA PECTORIS: Chronic | ICD-10-CM

## 2017-12-18 DIAGNOSIS — I70.0 THORACIC AORTA ATHEROSCLEROSIS: ICD-10-CM

## 2017-12-18 DIAGNOSIS — Z00.00 ENCOUNTER FOR PREVENTIVE HEALTH EXAMINATION: Primary | ICD-10-CM

## 2017-12-18 DIAGNOSIS — M54.9 CHRONIC BACK PAIN, UNSPECIFIED BACK LOCATION, UNSPECIFIED BACK PAIN LATERALITY: ICD-10-CM

## 2017-12-18 DIAGNOSIS — I10 ESSENTIAL HYPERTENSION: ICD-10-CM

## 2017-12-18 PROBLEM — Z01.810 ENCOUNTER FOR PRE-OPERATIVE CARDIOVASCULAR CLEARANCE: Status: RESOLVED | Noted: 2017-05-09 | Resolved: 2017-12-18

## 2017-12-18 PROCEDURE — G0439 PPPS, SUBSEQ VISIT: HCPCS | Mod: S$GLB,,, | Performed by: NURSE PRACTITIONER

## 2017-12-18 PROCEDURE — G0008 ADMIN INFLUENZA VIRUS VAC: HCPCS | Mod: S$GLB,,, | Performed by: FAMILY MEDICINE

## 2017-12-18 PROCEDURE — 99999 PR PBB SHADOW E&M-EST. PATIENT-LVL V: CPT | Mod: PBBFAC,,, | Performed by: NURSE PRACTITIONER

## 2017-12-18 PROCEDURE — 90662 IIV NO PRSV INCREASED AG IM: CPT | Mod: S$GLB,,, | Performed by: FAMILY MEDICINE

## 2017-12-18 RX ORDER — INSULIN ASPART 100 [IU]/ML
8-14 INJECTION, SOLUTION INTRAVENOUS; SUBCUTANEOUS
COMMUNITY
Start: 2017-10-13

## 2017-12-18 RX ORDER — FUROSEMIDE 40 MG/1
1 TABLET ORAL
COMMUNITY
Start: 2017-11-18 | End: 2020-07-08 | Stop reason: SDUPTHER

## 2017-12-18 RX ORDER — CALCIUM CITRATE/VITAMIN D3 200MG-6.25
TABLET ORAL 4 TIMES DAILY PRN
COMMUNITY
Start: 2017-10-14

## 2017-12-18 RX ORDER — AMITRIPTYLINE HYDROCHLORIDE 50 MG/1
50 TABLET, FILM COATED ORAL EVERY MORNING
COMMUNITY
End: 2024-03-08

## 2017-12-18 RX ORDER — LORATADINE 10 MG/1
1 TABLET ORAL DAILY
COMMUNITY
Start: 2017-12-11 | End: 2017-12-18 | Stop reason: SDUPTHER

## 2017-12-18 RX ORDER — PREGABALIN 100 MG/1
100 CAPSULE ORAL 2 TIMES DAILY
COMMUNITY
Start: 2017-12-12 | End: 2023-12-08

## 2017-12-18 RX ORDER — ISOPROPYL ALCOHOL 0.75 G/1
SWAB TOPICAL
COMMUNITY
Start: 2017-12-13

## 2017-12-18 RX ORDER — AMMONIUM LACTATE 12 G/100G
LOTION TOPICAL DAILY
COMMUNITY
Start: 2017-12-08

## 2017-12-18 NOTE — PROGRESS NOTES
"Aimee Aquino presented for a  Medicare AWV and comprehensive Health Risk Assessment today. The following components were reviewed and updated:    · Medical history  · Family History  · Social history  · Allergies and Current Medications  · Health Risk Assessment  · Health Maintenance  · Care Team     ** See Completed Assessments for Annual Wellness Visit within the encounter summary.**       The following assessments were completed:  · Living Situation  · CAGE  · Depression Screening  · Timed Get Up and Go  · Whisper Test  · Cognitive Function Screening          · Nutrition Screening  · ADL Screening  · PAQ Screening    Vitals:    12/18/17 1319   BP: (!) 143/69   Pulse: 65   Weight: 107.5 kg (236 lb 15.9 oz)   Height: 5' 7" (1.702 m)     Body mass index is 37.12 kg/m².  Physical Exam   Constitutional: She appears well-nourished.   HENT:   Head: Normocephalic and atraumatic.   Eyes: Conjunctivae are normal.   Cardiovascular: Normal rate and regular rhythm.    Pulmonary/Chest: Effort normal and breath sounds normal. No respiratory distress. She has no wheezes. She has no rales.   Musculoskeletal: She exhibits edema.   +2 edema ble   Neurological: She is alert. No cranial nerve deficit.   Skin: Skin is warm and dry.   Psychiatric: She has a normal mood and affect.         Diagnoses and health risks identified today and associated recommendations/orders:    1. Encounter for preventive health examination  Reviewed health maintenance and provided recommendations    Written rx for flu vaccine provided    2. History of CVA (cerebrovascular accident)  Stable.   Controlled on current medications.  Followed by Luis Alberto Castaneda MD .      3. Peripheral polyneuropathy  Stable.     Followed by Luis Alberto Castaneda MD .      4. Atrial fibrillation, unspecified type  Stable.   Controlled on current medications.  Followed by Luis Alberto Castaneda MD .      5. Congestive heart failure with LV diastolic dysfunction, NYHA class 2  Continue to " monitor   Followed by Luis Alberto Castaneda MD .      6. Coronary artery disease involving native coronary artery of native heart without angina pectoris  Stable.   No CP  Followed by Luis Alberto Castaneda MD .       7. Essential hypertension  Stable.   Controlled on current medications.  Followed by Luis Alberto Castaneda MD .       8. Hyperlipidemia, unspecified hyperlipidemia type  Continue to monitor lipids  Followed by Luis Alberto Castaneda MD .       9. Thoracic aorta atherosclerosis  Continue to monitor lipids  Followed by Luis Alberto Castaneda MD .       10. Severe obesity (BMI 35.0-35.9 with comorbidity)  Encourage healthy food choices  Followed by Luis Alberto Castaneda MD .       11. CKD (chronic kidney disease) stage 3, GFR 30-59 ml/min  Stable.   Continue to monitor   Followed by Luis Alberto Castaneda MD .       12. Peripheral edema  Unchanged    Followed by Luis Alberto Castaneda MD .       13. Tortuous aorta  Stable.   Controlled on current medications.  Followed by Luis Alberto Castaneda MD .       14. PAF (paroxysmal atrial fibrillation)  Stable.   Controlled on current medications.  Followed by Luis Alberto Castaneda MD .       15. Nonrheumatic aortic valve stenosis  Continue to monitor   Followed by Luis Alberto Castaneda MD .       16. Right ventricular failure  Continue to monitor   Followed Rodríguez Castaneda MD .       17. Gastroesophageal reflux disease, esophagitis presence not specified  Stable.     Followed by Luis Alberto Castaneda MD .       18. Chronic back pain, unspecified back location, unspecified back pain laterality  Unchanged  Followed by Luis Alberto Castaneda MD .       19. Obstructive sleep apnea of adult  Continue to monitor   Followed by Luis Alberto Castaneda MD .       20. Type 2 diabetes mellitus with stage 3 chronic kidney disease, with long-term current use of insulin  Continue to monitor   Followed by Luis Alberto Castaneda MD .         Provided Aimee with a 5-10 year written screening schedule and personal prevention plan. Recommendations  were developed using the USPSTF age appropriate recommendations. Education, counseling, and referrals were provided as needed. After Visit Summary printed and given to patient which includes a list of additional screenings\tests needed.    Return in about 1 year (around 12/18/2018).    Leni Hardwick NP

## 2017-12-18 NOTE — PATIENT INSTRUCTIONS
Counseling and Referral of Other Preventative  (Italic type indicates deductible and co-insurance are waived)    Patient Name: Aimee Aquino  Today's Date: 12/18/2017      SERVICE LIMITATIONS RECOMMENDATION    Vaccines    · Pneumococcal (once after 65)    · Influenza (annually)    · Hepatitis B (if medium/high risk)    · Prevnar 13      Hepatitis B medium/high risk factors:       - End-stage renal disease       - Hemophiliacs who received Factor VII or         IX concentrates       - Clients of institutions for the mentally             retarded       - Persons who live in the same house as          a HepB carrier       - Homosexual men       - Illicit injectable drug abusers     Pneumococcal: N/A external pcp, no records available     Influenza: Scheduled - see appointments     Hepatitis B: N/A     Prevnar 13: N/A external pcp, no records available    Mammogram (biennial age 50-74)  Annually (age 40 or over)  N/A external pcp, no records available    Pap (up to age 70 and after 70 if unknown history or abnormal study last 10 years)    N/A     The USPSTF recommends against screening for cervical cancer in women older than age 65 years who have had adequate prior screening and are not otherwise at high risk for cervical cancer.      Colorectal cancer screening (to age 75)    · Fecal occult blood test (annual)  · Flexible sigmoidoscopy (5y)  · Screening colonoscopy (10y)  · Barium enema   Last done 2015, recommend to repeat every 3  years    Diabetes self-management training (no USPSTF recommendations)  Requires referral by treating physician for patient with diabetes or renal disease. 10 hours of initial DSMT sessions of no less than 30 minutes each in a continuous 12-month period. 2 hours of follow-up DSMT in subsequent years.  N/A external pcp, no records available    Bone mass measurements (age 65 & older, biennial)  Requires diagnosis related to osteoporosis or estrogen deficiency. Biennial benefit unless patient  has history of long-term glucocorticoid  Last done 2016, recommend to repeat every 2  years    Glaucoma screening (no USPSTF recommendation)  Diabetes mellitus, family history   , age 50 or over    American, age 65 or over  Recommend follow up with eye care professional regularly    Medical nutrition therapy for diabetes or renal disease (no recommended schedule)  Requires referral by treating physician for patient with diabetes or renal disease or kidney transplant within the past 3 years.  Can be provided in same year as diabetes self-management training (DSMT), and CMS recommends medical nutrition therapy take place after DSMT. Up to 3 hours for initial year and 2 hours in subsequent years.  N/A external pcp, no records available    Cardiovascular screening blood tests (every 5 years)  · Fasting lipid panel  Order as a panel if possible  N/A external pcp, no records available    Diabetes screening tests (at least every 3 years, Medicare covers annually or at 6-month intervals for prediabetic patients)  · Fasting blood sugar (FBS) or glucose tolerance test (GTT)  Patient must be diagnosed with one of the following:       - Hypertension       - Dyslipidemia       - Obesity (BMI 30kg/m2)       - Previous elevated impaired FBS or GTT       ... or any two of the following:       - Overweight (BMI 25 but <30)       - Family history of diabetes       - Age 65 or older       - History of gestational diabetes or birth of baby weighing more than 9 pounds  N/A external pcp, no records available    HIV screening (annually for increased risk patients)  · HIV-1 and HIV-2 by EIA, or KADE, rapid antibody test or oral mucosa transudate  Patients must be at increased risk for HIV infection per USPSTF guidelines or pregnant. Tests covered annually for patient at increased risk or as requested by the patient. Pregnant patients may receive up to 3 tests during pregnancy.  Risks discussed, screening is not  recommended    Smoking cessation counseling (up to 8 sessions per year)  Patients must be asymptomatic of tobacco-related conditions to receive as a preventative service.  Non-smoker    Subsequent annual wellness visit  At least 12 months since last AWV  Return in one year     The following information is provided to all patients.  This information is to help you find resources for any of the problems found today that may be affecting your health:                Living healthy guide: www.[facility].[place].[facility]      Understanding Diabetes: www.diabetes.org      Eating healthy: www.cdc.gov/healthyweight      [facility] home safety checklist: www.cdc.gov/steadi/patient.html      Agency on Aging: www.goea.[place].[facility]      Alcoholics anonymous (AA): www.aa.org      Physical Activity: www.melanie.nih.gov/ci1fvfe      Tobacco use: www.quitwithusla.org

## 2018-01-09 RX ORDER — APIXABAN 2.5 MG/1
TABLET, FILM COATED ORAL
Qty: 180 TABLET | Refills: 0 | Status: SHIPPED | OUTPATIENT
Start: 2018-01-09 | End: 2018-05-01 | Stop reason: SDUPTHER

## 2018-05-01 RX ORDER — APIXABAN 2.5 MG/1
TABLET, FILM COATED ORAL
Qty: 180 TABLET | Refills: 0 | Status: SHIPPED | OUTPATIENT
Start: 2018-05-01 | End: 2018-05-16 | Stop reason: SDUPTHER

## 2018-05-02 NOTE — TELEPHONE ENCOUNTER
----- Message from Ofelia Stevenson sent at 5/2/2018 11:03 AM CDT -----  Type:  Patient Returning Call    Who Called:  Patient   Who Left Message for Patient:  Patient does not know  Does the patient know what this is regarding?:  Patient does not know  Best Call Back Number:  [phone]  Additional Information:  Patient states someone tried to reach her daughter to have her return call.

## 2018-05-16 ENCOUNTER — OFFICE VISIT (OUTPATIENT)
Dept: CARDIOLOGY | Facility: CLINIC | Age: 68
End: 2018-05-16
Payer: MEDICARE

## 2018-05-16 VITALS
HEIGHT: 67 IN | WEIGHT: 234.81 LBS | DIASTOLIC BLOOD PRESSURE: 62 MMHG | OXYGEN SATURATION: 94 % | BODY MASS INDEX: 36.85 KG/M2 | SYSTOLIC BLOOD PRESSURE: 114 MMHG | HEART RATE: 69 BPM

## 2018-05-16 DIAGNOSIS — I48.0 PAF (PAROXYSMAL ATRIAL FIBRILLATION): Primary | ICD-10-CM

## 2018-05-16 DIAGNOSIS — I27.20 PULMONARY HYPERTENSION: ICD-10-CM

## 2018-05-16 DIAGNOSIS — I25.10 CORONARY ARTERY DISEASE INVOLVING NATIVE CORONARY ARTERY OF NATIVE HEART WITHOUT ANGINA PECTORIS: Chronic | ICD-10-CM

## 2018-05-16 DIAGNOSIS — Z79.01 LONG TERM (CURRENT) USE OF ANTICOAGULANTS: ICD-10-CM

## 2018-05-16 DIAGNOSIS — E66.01 SEVERE OBESITY (BMI 35.0-35.9 WITH COMORBIDITY): ICD-10-CM

## 2018-05-16 DIAGNOSIS — I50.30 CONGESTIVE HEART FAILURE WITH LV DIASTOLIC DYSFUNCTION, NYHA CLASS 2: ICD-10-CM

## 2018-05-16 PROCEDURE — 3078F DIAST BP <80 MM HG: CPT | Mod: CPTII,S$GLB,, | Performed by: INTERNAL MEDICINE

## 2018-05-16 PROCEDURE — 3074F SYST BP LT 130 MM HG: CPT | Mod: CPTII,S$GLB,, | Performed by: INTERNAL MEDICINE

## 2018-05-16 PROCEDURE — 99214 OFFICE O/P EST MOD 30 MIN: CPT | Mod: S$GLB,,, | Performed by: INTERNAL MEDICINE

## 2018-05-16 RX ORDER — CEPHALEXIN 250 MG/1
250 CAPSULE ORAL DAILY
COMMUNITY
End: 2019-08-14 | Stop reason: ALTCHOICE

## 2018-05-16 NOTE — PROGRESS NOTES
Subjective:    Patient ID:  Aimee Aquino is a 68 y.o. female who presents for Congestive Heart Failure; Atrial Fibrillation; and Hyperlipidemia        HPI  DX WITH PRIMARY IMMUNE DEFICIENCY, TO START TX,RECENT LABS NOTED, LDL 31, HAD LABS PER PCP AT [facility], OBTAINED, HB 11.3, CR DOWN 0.91,NO CP, SOB, SEE ROS    Past Medical History:   Diagnosis Date    Anemia     will be starting iron infusions 2015    Anxiety     Aortic stenosis     Arthritis     Ascites     Atrial fibrillation     NOT SURE WHEN    Back pain, chronic     has spinal stimulator    Bladder spasms     Cancer     skin CA    CHF (congestive heart failure)     CKD (chronic kidney disease), stage III     not on dialysis    Coronary artery disease     Diabetes mellitus     Fibrocystic breast     Fibromyalgia     GERD (gastroesophageal reflux disease)     Gout     Heart murmur     History of urinary urgency     Hyperlipidemia     Hypertension     controlled with diuretic, sometimes hypotension    Neuropathy     Oxygen dependent     2 lpm most of the time    Restless legs syndrome     severe    Scoliosis     Shingles     Unsure of dates    Sleep apnea     CPAP, use with oxygen    Stenosis of aortic and mitral valves     Stroke     x3    Thyroid disease     hypothyroid    Venous insufficiency of leg     wears pumps on legs, boots    Vertigo     uses Antivert 3 times every day     Past Surgical History:   Procedure Laterality Date    APPENDECTOMY      BREAST BIOPSY Right     excisional over 20 yrs. neg liphoma    CARDIAC CATHETERIZATION  2010    no stents    CATARACT EXTRACTION W/  INTRAOCULAR LENS IMPLANT Bilateral     CHOLECYSTECTOMY      EYE SURGERY      cataracts removed    GASTRIC BYPASS  2010    HYSTERECTOMY      OOPHORECTOMY      PARTIAL HYSTERECTOMY  1980's they took one ovary only    PA EXPLORATORY OF ABDOMEN      w/ RT salpingo-oopherectomy    SHOULDER SURGERY      left shoulder    SPINAL CORD STIMULATOR  IMPLANT      TONSILLECTOMY, ADENOIDECTOMY      TOTAL KNEE ARTHROPLASTY Bilateral     VASCULAR SURGERY  Sept 2014    EVLT saphenous, left leg     Family History   Problem Relation Age of Onset    Diabetes Mother     Stroke Mother     Heart disease Mother     Heart attack Father     Heart disease Father     Heart disease Sister     Cancer Maternal Aunt     Breast cancer Maternal Aunt         50's    Stroke Paternal Aunt     Diabetes Paternal Aunt     Heart disease Maternal Grandfather     Cancer Maternal Grandfather     Heart attack Paternal Grandfather     Cancer Maternal Aunt      Social History     Social History    Marital status:      Spouse name: N/A    Number of children: N/A    Years of education: N/A     Social History Main Topics    Smoking status: Former Smoker     Quit date: 1/20/2010    Smokeless tobacco: Never Used    Alcohol use No    Drug use: No    Sexual activity: No     Other Topics Concern    Not on file     Social History Narrative    No narrative on file       Review of patient's allergies indicates:   Allergen Reactions    Iodinated contrast- oral and iv dye Swelling     Lip and face swelling  Lip and face swelling    Penicillins Blisters    Shellfish containing products Swelling    Adhesive tape-silicones Rash and Blisters     Use paper tape    Ambien [zolpidem] Other (See Comments)     sleepwalking    Codeine Rash    Demerol [meperidine] Rash    Penicillin g Rash     abcess  abcess    Sulfa (sulfonamide antibiotics) Rash    Sulfasalazine Rash    Tetracycline Rash       Current Outpatient Prescriptions:     ACETAMINOPHEN (TYLENOL 8 HOUR ORAL), Take 1 tablet by mouth once daily., Disp: , Rfl:     allopurinol (ZYLOPRIM) 300 MG tablet, Take 1 tablet (300 mg total) by mouth once daily., Disp: 30 tablet, Rfl: 5    amitriptyline (ELAVIL) 150 MG Tab, TAKE 1 TABLET (150 MG TOTAL) BY MOUTH EVERY EVENING., Disp: , Rfl: 5    amitriptyline (ELAVIL) 50 MG  "tablet, Take 50 mg by mouth every evening., Disp: , Rfl:     ammonium lactate (LAC-HYDRIN) 12 % lotion, Apply topically once daily., Disp: , Rfl:     apixaban (ELIQUIS) 2.5 mg Tab, Take 1 tablet (2.5 mg total) by mouth 2 (two) times daily., Disp: 180 tablet, Rfl: 2    ascorbic acid, vitamin C, (VITAMIN C) 1000 MG tablet, Take 1,000 mg by mouth once daily., Disp: , Rfl:     BD ALCOHOL SWABS PadM, , Disp: , Rfl:     biotin 2,500 mcg Cap, Take 5,000 mg by mouth once daily.  , Disp: , Rfl:     bumetanide (BUMEX) 0.5 MG Tab, TAKE 1 TABLET BY MOUTH EVERY MORNING MAY TAKE AN EXTRA DOSE IN PM FOR WEIGHT GAIN >3 LBS-2 DAYS, Disp: 60 tablet, Rfl: 5    bumetanide (BUMEX) 1 MG tablet, TAKE 1 TABLET (1 MG TOTAL) BY MOUTH 2 (TWO) TIMES DAILY. (Patient taking differently: TAKE 1 TABLET (1 MG TOTAL) BY MOUTH DAILY.), Disp: 60 tablet, Rfl: 6    cephALEXin (KEFLEX) 250 MG capsule, Take 250 mg by mouth once daily., Disp: , Rfl:     cranberry 1,000 mg Cap, Take 3,600 mg by mouth.  , Disp: , Rfl:     diclofenac sodium (VOLTAREN) 1 % Gel, APPLY TOPICALLY 4 (FOUR) TIMES DAILY AS NEEDED., Disp: 100 g, Rfl: 3    estradiol (ESTRACE) 1 MG tablet, 1 mg. , Disp: , Rfl: 7    fluconazole (DIFLUCAN) 100 MG tablet, TAKE 1 TABLET (100 MG TOTAL) BY MOUTH ONCE., Disp: 5 tablet, Rfl: 3    furosemide (LASIX) 40 MG tablet, Take 1 tablet by mouth as needed., Disp: , Rfl:     insulin glargine (LANTUS) 100 unit/mL injection, Inject 64 Units into the skin once daily. , Disp: , Rfl:     insulin syringe-needle U-100 (BD INSULIN SYRINGE ULTRA-FINE) 1 mL 31 x 5/16" Syrg, USE 3 TIMES DAILY, Disp: 100 each, Rfl: 6    lansoprazole (PREVACID) 30 MG capsule, 1 CAP 30 MIN BEFORE BREAKFAST AND SUPPER, Disp: , Rfl: 11    levothyroxine (SYNTHROID) 100 MCG tablet, TAKE 1 TABLET (100 MCG TOTAL) BY MOUTH ONCE DAILY., Disp: 30 tablet, Rfl: 5    loratadine (CLARITIN) 10 mg tablet, Take 10 mg by mouth once daily., Disp: , Rfl:     meclizine (ANTIVERT) 12.5 " mg tablet, TAKE 1 TABLET (12.5 MG TOTAL) BY MOUTH 3 (THREE) TIMES DAILY AS NEEDED. (Patient taking differently: TAKE 1 TABLET (12.5 MG TOTAL) BY MOUTH 2 TIMES DAILY AS NEEDED.), Disp: 90 tablet, Rfl: 0    mupirocin (BACTROBAN) 2 % ointment, , Disp: , Rfl:     NOVOLOG 100 unit/mL injection, Inject 15 Units into the skin 3 (three) times daily before meals., Disp: , Rfl:     oxycodone-acetaminophen (PERCOCET) 5-325 mg per tablet, Take 1 tablet by mouth daily as needed for Pain., Disp: 30 tablet, Rfl: 0    PATANOL 0.1 % ophthalmic solution, PLACE 1 DROP INTO BOTH EYES ONCE DAILY, Disp: 5 mL, Rfl: 3    pramipexole (MIRAPEX) 0.5 MG tablet, TAKE 3 TABLETS BY MOUTH IN P.M., Disp: 90 tablet, Rfl: 3    pregabalin (LYRICA) 50 MG capsule, Take 1 capsule by mouth 3 (three) times daily., Disp: , Rfl:     pyridoxine, vitamin B6, (VITAMIN B-6) 100 MG Tab, Take 50 mg by mouth once daily., Disp: , Rfl:     rosuvastatin (CRESTOR) 5 MG tablet, Take 1 tablet (5 mg total) by mouth once daily., Disp: 30 tablet, Rfl: 5    selenium 200 mcg Cap, Take 200 mg by mouth once daily., Disp: 30 capsule, Rfl: 5    tramadol (ULTRAM) 50 mg tablet, TAKE 1 TABLET EVERY 4 HOURS AS NEEDED, Disp: 60 tablet, Rfl: 0    TRUE METRIX GLUCOSE TEST STRIP Strp, by Alternating Nares route 4 (four) times daily as needed., Disp: , Rfl:     umeclidinium-vilanterol (ANORO ELLIPTA) 62.5-25 mcg/actuation DsDv, Inhale 1 puff into the lungs once daily., Disp: , Rfl:     vitamin D 1000 units Tab, Take 185 mg by mouth once daily., Disp: , Rfl:     nitrofurantoin, macrocrystal-monohydrate, (MACROBID) 100 MG capsule, , Disp: , Rfl:     Review of Systems   Constitution: Negative for chills, diaphoresis, fever, weakness, malaise/fatigue and night sweats.   HENT: Negative for congestion, hearing loss and nosebleeds.    Eyes: Negative for blurred vision and visual disturbance (MACULAR DEGENERATION).   Cardiovascular: Negative for chest pain, claudication, cyanosis,  "dyspnea on exertion (MILD), irregular heartbeat, leg swelling (MILD, B), near-syncope, orthopnea, palpitations, paroxysmal nocturnal dyspnea and syncope.   Respiratory: Negative for cough, hemoptysis, shortness of breath, sputum production and wheezing.    Endocrine: Negative for cold intolerance and heat intolerance.        BG OK   Hematologic/Lymphatic: Negative for adenopathy and bleeding problem. Does not bruise/bleed easily.   Skin: Negative for color change, itching and nail changes.   Musculoskeletal: Positive for arthritis. Negative for falls. Back pain: CHRONIC.   Gastrointestinal: Negative for abdominal pain, change in bowel habit, dysphagia, hematemesis, jaundice, melena and vomiting.        INCREASED ABD GIRTH   Genitourinary: Negative for dysuria, flank pain and frequency.        UTI'S   Neurological: Negative for brief paralysis, dizziness, focal weakness, light-headedness, loss of balance, numbness and tremors.   Psychiatric/Behavioral: Negative for altered mental status, depression, memory loss and substance abuse. The patient is not nervous/anxious.    Allergic/Immunologic: Positive for persistent infections. Negative for environmental allergies.        Objective:      Vitals:    05/16/18 1425   BP: 114/62   Pulse: 69   SpO2: (!) 94%   Weight: 106.5 kg (234 lb 12.6 oz)   Height: 5' 7" (1.702 m)   PainSc:   7     Body mass index is 36.77 kg/m².    Physical Exam   Constitutional: She is oriented to person, place, and time. She appears well-developed and well-nourished. She is active.   OBESE,    HENT:   Head: Normocephalic and atraumatic.   Mouth/Throat: Oropharynx is clear and moist and mucous membranes are normal.   Eyes: Conjunctivae and EOM are normal. Pupils are equal, round, and reactive to light.   Neck: Trachea normal and normal range of motion. Neck supple. Normal carotid pulses, no hepatojugular reflux and no JVD present. Carotid bruit is not present. No tracheal deviation, no edema and no " erythema present. No thyromegaly present.   Cardiovascular: Normal rate and regular rhythm.   No extrasystoles are present. PMI is not displaced.  Exam reveals no gallop and no friction rub.    Murmur heard.   Systolic murmur is present with a grade of 1/6  at the upper right sternal border  Pulses:       Carotid pulses are 2+ on the right side, and 2+ on the left side.       Radial pulses are 2+ on the right side, and 2+ on the left side.        Posterior tibial pulses are 2+ on the right side, and 2+ on the left side.   TRACE EDEMA   Pulmonary/Chest: Effort normal and breath sounds normal. No tachypnea and no bradypnea. She has no wheezes. She exhibits no tenderness.   Abdominal: Soft. Bowel sounds are normal. She exhibits no distension. There is no hepatosplenomegaly. There is no tenderness.   Musculoskeletal: Normal range of motion. She exhibits no edema or tenderness.   SLOW GAIT, WALKER   Lymphadenopathy:     She has no cervical adenopathy.   Neurological: She is alert and oriented to person, place, and time. She has normal strength. She displays no tremor. No cranial nerve deficit.   Skin: Skin is warm and dry. No erythema. No pallor (MILDLY).   Psychiatric: She has a normal mood and affect. Her speech is normal and behavior is normal.               ..    Chemistry        Component Value Date/Time     10/25/2016 1253    K 3.9 10/25/2016 1253    CL 98 10/25/2016 1253    CO2 33 (H) 10/25/2016 1253    BUN 43 (H) 10/25/2016 1253    CREATININE 1.25 10/25/2016 1253     10/25/2016 1253        Component Value Date/Time    CALCIUM 8.2 (L) 10/25/2016 1253    ALKPHOS 63 10/25/2016 1253    AST 25 10/25/2016 1253    ALT 40 10/25/2016 1253    BILITOT 0.4 10/25/2016 1253    ESTGFRAFRICA 51 (A) 10/25/2016 1253    EGFRNONAA 44 (A) 10/25/2016 1253            ..  Lab Results   Component Value Date    CHOL 123 08/18/2016     Lab Results   Component Value Date    HDL 33 (L) 08/18/2016     Lab Results   Component  Value Date    LDLCALC 54.4 (L) 08/18/2016     Lab Results   Component Value Date    TRIG 178 (H) 08/18/2016     Lab Results   Component Value Date    CHOLHDL 26.8 08/18/2016     ..  Lab Results   Component Value Date    WBC 10.38 10/25/2016    HGB 10.0 (L) 10/25/2016    HCT 32.5 (L) 10/25/2016     (H) 10/25/2016     10/25/2016       Test(s) Reviewed  I have reviewed the following in detail:  [] Stress test   [] Angiography   [] Echocardiogram   [x] Labs   [] Other:       Assessment:         ICD-10-CM ICD-9-CM   1. PAF (paroxysmal atrial fibrillation) I48.0 427.31   2. Congestive heart failure with LV diastolic dysfunction, NYHA class 2 I50.30 428.30     428.0   3. Coronary artery disease involving native coronary artery of native heart without angina pectoris I25.10 414.01   4. Long term (current) use of anticoagulants Z79.01 V58.61   5. Severe obesity (BMI 35.0-35.9 with comorbidity) E66.01 278.01    Z68.35 V85.35     Problem List Items Addressed This Visit        Cardiac/Vascular    Coronary artery disease involving native coronary artery of native heart without angina pectoris (Chronic)    Congestive heart failure with LV diastolic dysfunction, NYHA class 2    PAF (paroxysmal atrial fibrillation) - Primary       Hematology    Long term (current) use of anticoagulants       Endocrine    Severe obesity (BMI 35.0-35.9 with comorbidity)           Plan:     DAILY WEIGHT,INSTRUCTIONS GIVEN ( 2 LBS/D, 5/W RULE), ALL OTHER CV CLINICALLY STABLE, NO ANGINA, CLASS 2 HF, NO TIA, NO SIGNIFICANT CLINICAL ARRHYTHMIA,CONTINUE CURRENT MEDS, EDUCATION, DIET, EXERCISE, WEIGHT LOSS, RTC IN 6-7  MO WITH LABS      PAF (paroxysmal atrial fibrillation)    Congestive heart failure with LV diastolic dysfunction, NYHA class 2    Coronary artery disease involving native coronary artery of native heart without angina pectoris    Long term (current) use of anticoagulants    Severe obesity (BMI 35.0-35.9 with  comorbidity)    Other orders  -     apixaban (ELIQUIS) 2.5 mg Tab; Take 1 tablet (2.5 mg total) by mouth 2 (two) times daily.  Dispense: 180 tablet; Refill: 2    RTC Low level/low impact aerobic exercise 5x's/wk. Heart healthy diet and risk factor modification.    See labs and med orders.    Aerobic exercise 5x's/wk. Heart healthy diet and risk factor modification.    See labs and med orders.

## 2018-11-26 RX ORDER — APIXABAN 2.5 MG/1
TABLET, FILM COATED ORAL
Qty: 180 TABLET | Refills: 0 | Status: SHIPPED | OUTPATIENT
Start: 2018-11-26 | End: 2019-01-16 | Stop reason: SDUPTHER

## 2019-01-02 ENCOUNTER — TELEPHONE (OUTPATIENT)
Dept: CARDIOLOGY | Facility: CLINIC | Age: 69
End: 2019-01-02

## 2019-01-02 NOTE — TELEPHONE ENCOUNTER
Request for Cardiac Clearance    Aimee Aquino  is having tooth extractions on 1/14 and needs clearance.     Please advise on any medication holds. Patient taking eliquis.    Please indicate if patient is cleared from a Cardiac standpoint.

## 2019-01-02 NOTE — TELEPHONE ENCOUNTER
Attempted to call, no answer. Left message for patient to hold eliquis for 12 hours only, per Dr. Guerrier.

## 2019-01-02 NOTE — TELEPHONE ENCOUNTER
----- Message from Rosanna Velascoza sent at 1/2/2019  2:52 PM CST -----  She's having tooth extractions performed on 1/14, she wants to know when she can/should discontinue her blood thinners.  Please call her.  Thank you!

## 2019-01-16 ENCOUNTER — OFFICE VISIT (OUTPATIENT)
Dept: CARDIOLOGY | Facility: CLINIC | Age: 69
End: 2019-01-16
Payer: MEDICARE

## 2019-01-16 VITALS
BODY MASS INDEX: 37.72 KG/M2 | SYSTOLIC BLOOD PRESSURE: 120 MMHG | HEIGHT: 67 IN | WEIGHT: 240.31 LBS | OXYGEN SATURATION: 99 % | DIASTOLIC BLOOD PRESSURE: 64 MMHG | HEART RATE: 68 BPM

## 2019-01-16 DIAGNOSIS — I48.0 PAF (PAROXYSMAL ATRIAL FIBRILLATION): Primary | ICD-10-CM

## 2019-01-16 DIAGNOSIS — E66.01 SEVERE OBESITY (BMI 35.0-35.9 WITH COMORBIDITY): ICD-10-CM

## 2019-01-16 DIAGNOSIS — I10 ESSENTIAL HYPERTENSION: ICD-10-CM

## 2019-01-16 DIAGNOSIS — I50.30 CONGESTIVE HEART FAILURE WITH LV DIASTOLIC DYSFUNCTION, NYHA CLASS 2: ICD-10-CM

## 2019-01-16 DIAGNOSIS — E78.00 PURE HYPERCHOLESTEROLEMIA: ICD-10-CM

## 2019-01-16 DIAGNOSIS — Z79.01 LONG TERM (CURRENT) USE OF ANTICOAGULANTS: ICD-10-CM

## 2019-01-16 PROCEDURE — 99214 PR OFFICE/OUTPT VISIT, EST, LEVL IV, 30-39 MIN: ICD-10-PCS | Mod: S$GLB,,, | Performed by: INTERNAL MEDICINE

## 2019-01-16 PROCEDURE — 1101F PT FALLS ASSESS-DOCD LE1/YR: CPT | Mod: CPTII,S$GLB,, | Performed by: INTERNAL MEDICINE

## 2019-01-16 PROCEDURE — 99214 OFFICE O/P EST MOD 30 MIN: CPT | Mod: S$GLB,,, | Performed by: INTERNAL MEDICINE

## 2019-01-16 PROCEDURE — 3078F PR MOST RECENT DIASTOLIC BLOOD PRESSURE < 80 MM HG: ICD-10-PCS | Mod: CPTII,S$GLB,, | Performed by: INTERNAL MEDICINE

## 2019-01-16 PROCEDURE — 3074F SYST BP LT 130 MM HG: CPT | Mod: CPTII,S$GLB,, | Performed by: INTERNAL MEDICINE

## 2019-01-16 PROCEDURE — 3074F PR MOST RECENT SYSTOLIC BLOOD PRESSURE < 130 MM HG: ICD-10-PCS | Mod: CPTII,S$GLB,, | Performed by: INTERNAL MEDICINE

## 2019-01-16 PROCEDURE — 3078F DIAST BP <80 MM HG: CPT | Mod: CPTII,S$GLB,, | Performed by: INTERNAL MEDICINE

## 2019-01-16 PROCEDURE — 1101F PR PT FALLS ASSESS DOC 0-1 FALLS W/OUT INJ PAST YR: ICD-10-PCS | Mod: CPTII,S$GLB,, | Performed by: INTERNAL MEDICINE

## 2019-01-16 RX ORDER — ROSUVASTATIN CALCIUM 5 MG/1
5 TABLET, COATED ORAL DAILY
Qty: 90 TABLET | Refills: 1 | Status: SHIPPED | OUTPATIENT
Start: 2019-01-16 | End: 2019-08-14 | Stop reason: SDUPTHER

## 2019-01-16 NOTE — PROGRESS NOTES
Subjective:    Patient ID:  Aimee Aquino is a 68 y.o. female who presents for Atrial Fibrillation and Congestive Heart Failure        HPI  LABS FROM June THEN September NOTED, CMP OK, HB 11.6, FEELS MUCH BETTER WITH IMMNUETHERAPY, INFUSION, BREATHING OK, SEE ROS    Past Medical History:   Diagnosis Date    Anemia     will be starting iron infusions 2015    Anxiety     Aortic stenosis     Arthritis     Ascites     Atrial fibrillation     NOT SURE WHEN    Back pain, chronic     has spinal stimulator    Bladder spasms     Cancer     skin CA    CHF (congestive heart failure)     CKD (chronic kidney disease), stage III     not on dialysis    Coronary artery disease     Diabetes mellitus     Fibrocystic breast     Fibromyalgia     GERD (gastroesophageal reflux disease)     Gout     Heart murmur     History of urinary urgency     Hyperlipidemia     Hypertension     controlled with diuretic, sometimes hypotension    Neuropathy     Oxygen dependent     2 lpm most of the time    Restless legs syndrome     severe    Scoliosis     Shingles     Unsure of dates    Sleep apnea     CPAP, use with oxygen    Stenosis of aortic and mitral valves     Stroke     x3    Thyroid disease     hypothyroid    Venous insufficiency of leg     wears pumps on legs, boots    Vertigo     uses Antivert 3 times every day     Past Surgical History:   Procedure Laterality Date    ABLATION- EVLT ENDO VASCULAR LASER THERAPY Right 1/20/2015    Performed by [facility] Fairbanks [facility] at [facility]    APPENDECTOMY      BREAST BIOPSY Right     excisional over 20 yrs. neg liphoma    CARDIAC CATHETERIZATION  2010    no stents    CATARACT EXTRACTION W/  INTRAOCULAR LENS IMPLANT Bilateral     CHOLECYSTECTOMY      EYE SURGERY      cataracts removed    GASTRIC BYPASS  2010    HYSTERECTOMY      OOPHORECTOMY      PARTIAL HYSTERECTOMY  1980's they took one ovary only    CO EXPLORATORY OF ABDOMEN      w/ RT salpingo-oopherectomy     SHOULDER SURGERY      left shoulder    SPINAL CORD STIMULATOR IMPLANT      TONSILLECTOMY, ADENOIDECTOMY      TOTAL KNEE ARTHROPLASTY Bilateral     VASCULAR SURGERY  2014    EVLT saphenous, left leg     Family History   Problem Relation Age of Onset    Diabetes Mother     Stroke Mother     Heart disease Mother     Heart attack Father     Heart disease Father     Heart disease Sister     Cancer Maternal Aunt     Breast cancer Maternal Aunt         50's    Stroke Paternal Aunt     Diabetes Paternal Aunt     Heart disease Maternal Grandfather     Cancer Maternal Grandfather     Heart attack Paternal Grandfather     Cancer Maternal Aunt      Social History     Socioeconomic History    Marital status:      Spouse name: Not on file    Number of children: Not on file    Years of education: Not on file    Highest education level: Not on file   Social Needs    Financial resource strain: Not on file    Food insecurity - worry: Not on file    Food insecurity - inability: Not on file    Transportation needs - medical: Not on file    Transportation needs - non-medical: Not on file   Occupational History    Not on file   Tobacco Use    Smoking status: Former Smoker     Last attempt to quit: 2010     Years since quittin.9    Smokeless tobacco: Never Used   Substance and Sexual Activity    Alcohol use: No    Drug use: No    Sexual activity: No   Other Topics Concern    Not on file   Social History Narrative    Not on file       Review of patient's allergies indicates:   Allergen Reactions    Iodinated contrast- oral and iv dye Swelling     Lip and face swelling  Lip and face swelling    Penicillins Blisters    Shellfish containing products Swelling    Adhesive tape-silicones Rash and Blisters     Use paper tape    Ambien [zolpidem] Other (See Comments)     sleepwalking    Codeine Rash    Demerol [meperidine] Rash    Penicillin g Rash     abcess  abcess    Sulfa  (sulfonamide antibiotics) Rash    Sulfasalazine Rash    Tetracycline Rash       Current Outpatient Medications:     ACETAMINOPHEN (TYLENOL 8 HOUR ORAL), Take 1 tablet by mouth once daily., Disp: , Rfl:     allopurinol (ZYLOPRIM) 300 MG tablet, Take 1 tablet (300 mg total) by mouth once daily., Disp: 30 tablet, Rfl: 5    amitriptyline (ELAVIL) 150 MG Tab, TAKE 1 TABLET (150 MG TOTAL) BY MOUTH EVERY EVENING., Disp: , Rfl: 5    amitriptyline (ELAVIL) 50 MG tablet, Take 50 mg by mouth every evening., Disp: , Rfl:     ammonium lactate (LAC-HYDRIN) 12 % lotion, Apply topically once daily., Disp: , Rfl:     apixaban (ELIQUIS) 2.5 mg Tab, Take 1 tablet (2.5 mg total) by mouth 2 (two) times daily., Disp: 180 tablet, Rfl: 1    ascorbic acid, vitamin C, (VITAMIN C) 1000 MG tablet, Take 1,000 mg by mouth once daily., Disp: , Rfl:     BD ALCOHOL SWABS PadM, , Disp: , Rfl:     biotin 2,500 mcg Cap, Take 5,000 mg by mouth once daily.  , Disp: , Rfl:     bumetanide (BUMEX) 0.5 MG Tab, TAKE 1 TABLET BY MOUTH EVERY MORNING MAY TAKE AN EXTRA DOSE IN PM FOR WEIGHT GAIN >3 LBS-2 DAYS (Patient taking differently: 1 mg. TAKE 1 TABLET BY MOUTH EVERY MORNING MAY TAKE AN EXTRA DOSE IN PM FOR WEIGHT GAIN >3 LBS-2 DAYS), Disp: 60 tablet, Rfl: 5    bumetanide (BUMEX) 1 MG tablet, TAKE 1 TABLET (1 MG TOTAL) BY MOUTH 2 (TWO) TIMES DAILY. (Patient taking differently: TAKE 1 TABLET (1 MG TOTAL) BY MOUTH DAILY.), Disp: 60 tablet, Rfl: 6    calcitonin, salmon, (FORTICAL) 200 unit/actuation nasal spray, 1 spray by Nasal route once daily., Disp: , Rfl:     cholecalciferol, vitamin D3, 5,000 unit capsule, Take 5,000 Units by mouth once daily. , Disp: , Rfl:     cranberry 1,000 mg Cap, Take 3,600 mg by mouth.  , Disp: , Rfl:     cyanocobalamin (VITAMIN B-12) 500 MCG tablet, Take 500 mcg by mouth once daily., Disp: , Rfl:     estradiol (ESTRACE) 1 MG tablet, 1 mg. , Disp: , Rfl: 7    fluconazole (DIFLUCAN) 100 MG tablet, TAKE 1 TABLET  "(100 MG TOTAL) BY MOUTH ONCE., Disp: 5 tablet, Rfl: 3    furosemide (LASIX) 40 MG tablet, Take 1 tablet by mouth as needed., Disp: , Rfl:     guaiFENesin (HUMIBID E) 400 mg Tab, Take 400 mg by mouth., Disp: , Rfl:     immun glob G,IgG,/pro/IgA 0-50 (HIZENTRA SUBQ), Inject into the skin., Disp: , Rfl:     insulin glargine (LANTUS) 100 unit/mL injection, Inject 68 Units into the skin once daily. , Disp: , Rfl:     insulin syringe-needle U-100 (BD INSULIN SYRINGE ULTRA-FINE) 1 mL 31 x 5/16" Syrg, USE 3 TIMES DAILY, Disp: 100 each, Rfl: 6    levothyroxine (SYNTHROID) 100 MCG tablet, TAKE 1 TABLET (100 MCG TOTAL) BY MOUTH ONCE DAILY., Disp: 30 tablet, Rfl: 5    loratadine (CLARITIN) 10 mg tablet, Take 10 mg by mouth once daily., Disp: , Rfl:     losartan (COZAAR) 25 MG tablet, Take 50 mg by mouth once daily. , Disp: , Rfl:     meclizine (ANTIVERT) 12.5 mg tablet, TAKE 1 TABLET (12.5 MG TOTAL) BY MOUTH 3 (THREE) TIMES DAILY AS NEEDED. (Patient taking differently: TAKE 1 TABLET (12.5 MG TOTAL) BY MOUTH 2 TIMES DAILY AS NEEDED.), Disp: 90 tablet, Rfl: 0    mupirocin (BACTROBAN) 2 % ointment, , Disp: , Rfl:     nitrofurantoin, macrocrystal-monohydrate, (MACROBID) 100 MG capsule, , Disp: , Rfl:     NOVOLOG 100 unit/mL injection, Inject 15 Units into the skin 3 (three) times daily before meals. , Disp: , Rfl:     oxycodone-acetaminophen (PERCOCET) 5-325 mg per tablet, Take 1 tablet by mouth daily as needed for Pain., Disp: 30 tablet, Rfl: 0    PATANOL 0.1 % ophthalmic solution, PLACE 1 DROP INTO BOTH EYES ONCE DAILY, Disp: 5 mL, Rfl: 3    pramipexole (MIRAPEX) 0.5 MG tablet, TAKE 3 TABLETS BY MOUTH IN P.M., Disp: 90 tablet, Rfl: 3    pregabalin (LYRICA) 50 MG capsule, Take 1 capsule by mouth 3 (three) times daily., Disp: , Rfl:     pyridoxine, vitamin B6, (VITAMIN B-6) 100 MG Tab, Take 50 mg by mouth once daily., Disp: , Rfl:     rosuvastatin (CRESTOR) 5 MG tablet, Take 1 tablet (5 mg total) by mouth once " daily., Disp: 90 tablet, Rfl: 1    selenium 200 mcg Cap, Take 200 mg by mouth once daily., Disp: 30 capsule, Rfl: 5    thiamine HCl (VITAMIN B-1) 500 MG tablet, Take 500 mg by mouth once daily., Disp: , Rfl:     TRUE METRIX GLUCOSE TEST STRIP Strp, by Alternating Nares route 4 (four) times daily as needed., Disp: , Rfl:     umeclidinium-vilanterol (ANORO ELLIPTA) 62.5-25 mcg/actuation DsDv, Inhale 1 puff into the lungs once daily., Disp: , Rfl:     cephALEXin (KEFLEX) 250 MG capsule, Take 250 mg by mouth once daily., Disp: , Rfl:     diclofenac sodium (VOLTAREN) 1 % Gel, APPLY TOPICALLY 4 (FOUR) TIMES DAILY AS NEEDED., Disp: 100 g, Rfl: 3    Review of Systems   Constitution: Negative for chills, diaphoresis, fever, weakness, malaise/fatigue and night sweats. Weight gain: SOME.   HENT: Negative for congestion, hearing loss and nosebleeds.    Eyes: Negative for blurred vision and visual disturbance (MACULAR DEGENERATION).   Cardiovascular: Negative for chest pain, claudication, cyanosis, dyspnea on exertion (MILD), irregular heartbeat, leg swelling (MILD, B), near-syncope, orthopnea, palpitations, paroxysmal nocturnal dyspnea and syncope.   Respiratory: Negative for cough, hemoptysis, shortness of breath and wheezing.    Endocrine: Negative for cold intolerance and heat intolerance.        BG OK   Hematologic/Lymphatic: Negative for adenopathy and bleeding problem. Does not bruise/bleed easily.   Skin: Negative for color change, itching and rash.   Musculoskeletal: Negative for arthritis, back pain (CHRONIC) and falls (ONCE,FELL WHILE GETTING OUT OF BED).   Gastrointestinal: Negative for abdominal pain, change in bowel habit, hematemesis, jaundice, melena and vomiting.        INCREASED ABD GIRTH   Genitourinary: Negative for dysuria, flank pain and frequency.        UTI'S   Neurological: Negative for brief paralysis, dizziness, focal weakness, light-headedness, loss of balance and tremors.  "  Psychiatric/Behavioral: Negative for altered mental status, depression, memory loss and substance abuse. The patient is not nervous/anxious.    Allergic/Immunologic: Negative for environmental allergies, hives and persistent infections.        Objective:      Vitals:    01/16/19 1543   BP: 120/64   Pulse: 68   SpO2: 99%   Weight: 109 kg (240 lb 4.8 oz)   Height: 5' 7" (1.702 m)   PainSc: 0-No pain     Body mass index is 37.64 kg/m².    Physical Exam   Constitutional: She is oriented to person, place, and time. She appears well-developed and well-nourished. She is active.   OBESE,    HENT:   Head: Normocephalic and atraumatic.   Mouth/Throat: Oropharynx is clear and moist and mucous membranes are normal.   Eyes: Conjunctivae and EOM are normal. Pupils are equal, round, and reactive to light.   Neck: Trachea normal and normal range of motion. Neck supple. Normal carotid pulses, no hepatojugular reflux and no JVD present. Carotid bruit is not present. No tracheal deviation, no edema and no erythema present.   Cardiovascular: Normal rate and regular rhythm.  No extrasystoles are present. PMI is not displaced. Exam reveals no gallop and no friction rub.   Murmur heard.   Systolic murmur is present with a grade of 1/6 at the upper right sternal border.  Pulses:       Carotid pulses are 2+ on the right side, and 2+ on the left side.       Radial pulses are 2+ on the right side, and 2+ on the left side.        Posterior tibial pulses are 2+ on the right side, and 2+ on the left side.   TRACE EDEMA   Pulmonary/Chest: Effort normal and breath sounds normal. No tachypnea and no bradypnea. She has no wheezes. She exhibits no tenderness.   Abdominal: Soft. Bowel sounds are normal. She exhibits no distension. There is no hepatosplenomegaly. There is no tenderness.   Musculoskeletal: Normal range of motion. She exhibits no edema or tenderness.   SLOW GAIT, WALKER   Lymphadenopathy:     She has no cervical adenopathy. "   Neurological: She is alert and oriented to person, place, and time. She has normal strength. She displays no tremor. No cranial nerve deficit.   Skin: Skin is warm and dry. No erythema. No pallor (MILDLY).   Psychiatric: She has a normal mood and affect. Her speech is normal and behavior is normal.               ..    Chemistry        Component Value Date/Time     09/19/2018 0508    K 3.5 09/19/2018 0508     09/19/2018 0508    CO2 32 (H) 09/19/2018 0508    BUN 45 (H) 09/19/2018 0508    CREATININE 1.16 09/19/2018 0508     (H) 09/19/2018 0508        Component Value Date/Time    CALCIUM 8.5 09/19/2018 0508    ALKPHOS 68 09/19/2018 0508    AST 23 09/19/2018 0508    ALT 44 09/19/2018 0508    BILITOT 0.2 09/19/2018 0508    ESTGFRAFRICA 55 (A) 09/19/2018 0508    EGFRNONAA 48 (A) 09/19/2018 0508            ..  Lab Results   Component Value Date    CHOL 123 08/18/2016     Lab Results   Component Value Date    HDL 33 (L) 08/18/2016     Lab Results   Component Value Date    LDLCALC 54.4 (L) 08/18/2016     Lab Results   Component Value Date    TRIG 178 (H) 08/18/2016     Lab Results   Component Value Date    CHOLHDL 26.8 08/18/2016     ..  Lab Results   Component Value Date    WBC 5.70 09/19/2018    HGB 11.6 (L) 09/19/2018    HCT 36.6 (L) 09/19/2018    MCV 97 09/19/2018     (L) 09/19/2018       Test(s) Reviewed  I have reviewed the following in detail:  [] Stress test   [] Angiography   [] Echocardiogram   [x] Labs   [] Other:       Assessment:         ICD-10-CM ICD-9-CM   1. PAF (paroxysmal atrial fibrillation) I48.0 427.31   2. Congestive heart failure with LV diastolic dysfunction, NYHA class 2 I50.30 428.30     428.0   3. Essential hypertension I10 401.9   4. Long term (current) use of anticoagulants Z79.01 V58.61   5. Severe obesity (BMI 35.0-35.9 with comorbidity) E66.01 278.01    Z68.35 V85.35   6. Pure hypercholesterolemia E78.00 272.0     Problem List Items Addressed This Visit         Cardiac/Vascular    Essential hypertension    Relevant Orders    Comprehensive metabolic panel    Congestive heart failure with LV diastolic dysfunction, NYHA class 2    Relevant Orders    Comprehensive metabolic panel    Pure hypercholesterolemia    Relevant Orders    Comprehensive metabolic panel    Lipid panel    PAF (paroxysmal atrial fibrillation) - Primary    Relevant Orders    Comprehensive metabolic panel       Hematology    Long term (current) use of anticoagulants    Relevant Orders    Comprehensive metabolic panel       Endocrine    Severe obesity (BMI 35.0-35.9 with comorbidity)    Relevant Orders    Comprehensive metabolic panel           Plan:         DAILY WEIGHT, INCREASE ACTIVITY, ALL CV CLINICALLY STABLE, NO ANGINA, CLASS 1-2  HF, NO TIA, NO CLINICAL ARRHYTHMIA,CONTINUE CURRENT MEDS, EDUCATION, DIET, EXERCISE, WEIGHT LOSS, RTC IN 6-7 MO WITH LABS    PAF (paroxysmal atrial fibrillation)  -     Comprehensive metabolic panel; Future; Expected date: 01/16/2019    Congestive heart failure with LV diastolic dysfunction, NYHA class 2  -     Comprehensive metabolic panel; Future; Expected date: 01/16/2019    Essential hypertension  -     Comprehensive metabolic panel; Future; Expected date: 01/16/2019    Long term (current) use of anticoagulants  -     Comprehensive metabolic panel; Future; Expected date: 01/16/2019    Severe obesity (BMI 35.0-35.9 with comorbidity)  -     Comprehensive metabolic panel; Future; Expected date: 01/16/2019    Pure hypercholesterolemia  -     Comprehensive metabolic panel; Future; Expected date: 01/16/2019  -     Lipid panel; Future; Expected date: 01/16/2019    Other orders  -     apixaban (ELIQUIS) 2.5 mg Tab; Take 1 tablet (2.5 mg total) by mouth 2 (two) times daily.  Dispense: 180 tablet; Refill: 1  -     rosuvastatin (CRESTOR) 5 MG tablet; Take 1 tablet (5 mg total) by mouth once daily.  Dispense: 90 tablet; Refill: 1    RTC Low level/low impact aerobic exercise 5x's/wk.  Heart healthy diet and risk factor modification.    See labs and med orders.    Aerobic exercise 5x's/wk. Heart healthy diet and risk factor modification.    See labs and med orders.

## 2019-04-04 ENCOUNTER — TELEPHONE (OUTPATIENT)
Dept: CARDIOLOGY | Facility: CLINIC | Age: 69
End: 2019-04-04

## 2019-04-04 NOTE — TELEPHONE ENCOUNTER
Shanelle Rainey,     We got a denial letter from [facility] stating that it was denied this past Monday.

## 2019-04-04 NOTE — TELEPHONE ENCOUNTER
Good afternoon,     This patient was denied approval of coverage for Eliquis. I wanted to see if she may qualify for the assistance program? Thank you for your help!    Betzy Carrillo LPN

## 2019-04-12 ENCOUNTER — TELEPHONE (OUTPATIENT)
Dept: CARDIOLOGY | Facility: CLINIC | Age: 69
End: 2019-04-12

## 2019-08-14 ENCOUNTER — OFFICE VISIT (OUTPATIENT)
Dept: CARDIOLOGY | Facility: CLINIC | Age: 69
End: 2019-08-14
Payer: MEDICARE

## 2019-08-14 VITALS
WEIGHT: 230.63 LBS | SYSTOLIC BLOOD PRESSURE: 120 MMHG | DIASTOLIC BLOOD PRESSURE: 62 MMHG | OXYGEN SATURATION: 99 % | HEART RATE: 51 BPM | BODY MASS INDEX: 36.2 KG/M2 | HEIGHT: 67 IN

## 2019-08-14 DIAGNOSIS — I50.30 CONGESTIVE HEART FAILURE WITH LV DIASTOLIC DYSFUNCTION, NYHA CLASS 2: ICD-10-CM

## 2019-08-14 DIAGNOSIS — E66.01 SEVERE OBESITY (BMI 35.0-35.9 WITH COMORBIDITY): ICD-10-CM

## 2019-08-14 DIAGNOSIS — I25.10 CORONARY ARTERY DISEASE INVOLVING NATIVE CORONARY ARTERY OF NATIVE HEART WITHOUT ANGINA PECTORIS: Chronic | ICD-10-CM

## 2019-08-14 DIAGNOSIS — I48.0 PAF (PAROXYSMAL ATRIAL FIBRILLATION): Primary | ICD-10-CM

## 2019-08-14 DIAGNOSIS — Z79.01 LONG TERM (CURRENT) USE OF ANTICOAGULANTS: ICD-10-CM

## 2019-08-14 DIAGNOSIS — I35.0 NONRHEUMATIC AORTIC VALVE STENOSIS: ICD-10-CM

## 2019-08-14 PROCEDURE — 3074F PR MOST RECENT SYSTOLIC BLOOD PRESSURE < 130 MM HG: ICD-10-PCS | Mod: CPTII,S$GLB,, | Performed by: INTERNAL MEDICINE

## 2019-08-14 PROCEDURE — 1101F PT FALLS ASSESS-DOCD LE1/YR: CPT | Mod: CPTII,S$GLB,, | Performed by: INTERNAL MEDICINE

## 2019-08-14 PROCEDURE — 3074F SYST BP LT 130 MM HG: CPT | Mod: CPTII,S$GLB,, | Performed by: INTERNAL MEDICINE

## 2019-08-14 PROCEDURE — 99214 PR OFFICE/OUTPT VISIT, EST, LEVL IV, 30-39 MIN: ICD-10-PCS | Mod: S$GLB,,, | Performed by: INTERNAL MEDICINE

## 2019-08-14 PROCEDURE — 3078F PR MOST RECENT DIASTOLIC BLOOD PRESSURE < 80 MM HG: ICD-10-PCS | Mod: CPTII,S$GLB,, | Performed by: INTERNAL MEDICINE

## 2019-08-14 PROCEDURE — 3078F DIAST BP <80 MM HG: CPT | Mod: CPTII,S$GLB,, | Performed by: INTERNAL MEDICINE

## 2019-08-14 PROCEDURE — 99214 OFFICE O/P EST MOD 30 MIN: CPT | Mod: S$GLB,,, | Performed by: INTERNAL MEDICINE

## 2019-08-14 PROCEDURE — 1101F PR PT FALLS ASSESS DOC 0-1 FALLS W/OUT INJ PAST YR: ICD-10-PCS | Mod: CPTII,S$GLB,, | Performed by: INTERNAL MEDICINE

## 2019-08-14 RX ORDER — ROSUVASTATIN CALCIUM 5 MG/1
5 TABLET, COATED ORAL DAILY
Qty: 90 TABLET | Refills: 1 | Status: SHIPPED | OUTPATIENT
Start: 2019-08-14 | End: 2020-03-04 | Stop reason: SDUPTHER

## 2019-08-14 RX ORDER — VALSARTAN 320 MG/1
320 TABLET ORAL DAILY
Refills: 2 | COMMUNITY
Start: 2019-07-23 | End: 2020-07-28

## 2019-08-14 NOTE — PROGRESS NOTES
Subjective:    Patient ID:  Aimee Aquino is a 69 y.o. female who presents for Atrial Fibrillation (Labs) and Congestive Heart Failure        HPI  DISCUSSED LABS AND GOALS, RECENT CMP CR 1.46, HBA1C 6.6%, LDL 29, RECURRENT FALLS/RLS, NOW BETTER WITH MEDS CHANGE PER PCP,HEALING FOOT ULCER,  NO CHEST PAIN PER SE, NO TIA TYPE SYMPTOMS, NO NEAR SYNCOPE, MILD DISTANT EXERTION,, SEE ROS    Past Medical History:   Diagnosis Date    Anemia     will be starting iron infusions 2015    Anxiety     Aortic stenosis     Arthritis     Ascites     Atrial fibrillation     NOT SURE WHEN    Back pain, chronic     has spinal stimulator    Bladder spasms     Cancer     skin CA    CHF (congestive heart failure)     CKD (chronic kidney disease), stage III     not on dialysis    Coronary artery disease     Diabetes mellitus     Fibrocystic breast     Fibromyalgia     GERD (gastroesophageal reflux disease)     Gout     Heart murmur     History of urinary urgency     Hyperlipidemia     Hypertension     controlled with diuretic, sometimes hypotension    Neuropathy     Oxygen dependent     2 lpm most of the time    Restless legs syndrome     severe    Scoliosis     Shingles     Unsure of dates    Sleep apnea     CPAP, use with oxygen    Stenosis of aortic and mitral valves     Stroke     x3    Thyroid disease     hypothyroid    Venous insufficiency of leg     wears pumps on legs, boots    Vertigo     uses Antivert 3 times every day     Past Surgical History:   Procedure Laterality Date    ABLATION- EVLT ENDO VASCULAR LASER THERAPY Right 1/20/2015    Performed by [facility]banks [facility] at [facility]    APPENDECTOMY      BREAST BIOPSY Right     excisional over 20 yrs. neg liphoma    CARDIAC CATHETERIZATION  2010    no stents    CATARACT EXTRACTION W/  INTRAOCULAR LENS IMPLANT Bilateral     CHOLECYSTECTOMY      EYE SURGERY      cataracts removed    GASTRIC BYPASS  2010    HYSTERECTOMY      OOPHORECTOMY      PARTIAL  HYSTERECTOMY   they took one ovary only    CT EXPLORATORY OF ABDOMEN      w/ RT salpingo-oopherectomy    SHOULDER SURGERY      left shoulder    SPINAL CORD STIMULATOR IMPLANT      TONSILLECTOMY, ADENOIDECTOMY      TOTAL KNEE ARTHROPLASTY Bilateral     VASCULAR SURGERY  2014    EVLT saphenous, left leg     Family History   Problem Relation Age of Onset    Diabetes Mother     Stroke Mother     Heart disease Mother     Heart attack Father     Heart disease Father     Heart disease Sister     Cancer Maternal Aunt     Breast cancer Maternal Aunt         50's    Stroke Paternal Aunt     Diabetes Paternal Aunt     Heart disease Maternal Grandfather     Cancer Maternal Grandfather     Heart attack Paternal Grandfather     Cancer Maternal Aunt      Social History     Socioeconomic History    Marital status:      Spouse name: Not on file    Number of children: Not on file    Years of education: Not on file    Highest education level: Not on file   Occupational History    Not on file   Social Needs    Financial resource strain: Not on file    Food insecurity:     Worry: Not on file     Inability: Not on file    Transportation needs:     Medical: Not on file     Non-medical: Not on file   Tobacco Use    Smoking status: Former Smoker     Last attempt to quit: 2010     Years since quittin.5    Smokeless tobacco: Never Used   Substance and Sexual Activity    Alcohol use: No    Drug use: No    Sexual activity: Never   Lifestyle    Physical activity:     Days per week: Not on file     Minutes per session: Not on file    Stress: Not on file   Relationships    Social connections:     Talks on phone: Not on file     Gets together: Not on file     Attends Worship service: Not on file     Active member of club or organization: Not on file     Attends meetings of clubs or organizations: Not on file     Relationship status: Not on file   Other Topics Concern    Not on file    Social History Narrative    Not on file       Review of patient's allergies indicates:   Allergen Reactions    Iodinated contrast- oral and iv dye Swelling     Lip and face swelling  Lip and face swelling    Penicillins Blisters    Shellfish containing products Swelling    Adhesive tape-silicones Rash and Blisters     Use paper tape    Ambien [zolpidem] Other (See Comments)     sleepwalking    Codeine Rash    Demerol [meperidine] Rash    Penicillin g Rash     abcess  abcess    Sulfa (sulfonamide antibiotics) Rash    Sulfasalazine Rash    Tetracycline Rash       Current Outpatient Medications:     ACETAMINOPHEN (TYLENOL 8 HOUR ORAL), Take 1 tablet by mouth once daily., Disp: , Rfl:     allopurinol (ZYLOPRIM) 300 MG tablet, Take 1 tablet (300 mg total) by mouth once daily., Disp: 30 tablet, Rfl: 5    amitriptyline (ELAVIL) 100 MG tablet, Takes two tablets once a day, Disp: , Rfl: 5    amitriptyline (ELAVIL) 50 MG tablet, Take 50 mg by mouth every evening., Disp: , Rfl:     ammonium lactate (LAC-HYDRIN) 12 % lotion, Apply topically once daily., Disp: , Rfl:     apixaban (ELIQUIS) 2.5 mg Tab, Take 1 tablet (2.5 mg total) by mouth 2 (two) times daily., Disp: 180 tablet, Rfl: 1    ascorbic acid, vitamin C, (VITAMIN C) 1000 MG tablet, Take 1,000 mg by mouth once daily., Disp: , Rfl:     BD ALCOHOL SWABS PadM, , Disp: , Rfl:     biotin 2,500 mcg Cap, Take 5,000 mg by mouth once daily.  , Disp: , Rfl:     bumetanide (BUMEX) 0.5 MG Tab, TAKE 1 TABLET BY MOUTH EVERY MORNING MAY TAKE AN EXTRA DOSE IN PM FOR WEIGHT GAIN >3 LBS-2 DAYS (Patient taking differently: 1 mg. TAKE 1 TABLET BY MOUTH EVERY MORNING MAY TAKE AN EXTRA DOSE IN PM FOR WEIGHT GAIN >3 LBS-2 DAYS), Disp: 60 tablet, Rfl: 5    bumetanide (BUMEX) 1 MG tablet, TAKE 1 TABLET (1 MG TOTAL) BY MOUTH 2 (TWO) TIMES DAILY. (Patient taking differently: TAKE 1 TABLET (1 MG TOTAL) BY MOUTH DAILY.), Disp: 60 tablet, Rfl: 6    calcitonin, salmon,  "(FORTICAL) 200 unit/actuation nasal spray, 1 spray by Nasal route once daily., Disp: , Rfl:     cholecalciferol, vitamin D3, 5,000 unit capsule, Take 5,000 Units by mouth once daily. , Disp: , Rfl:     cyanocobalamin (VITAMIN B-12) 500 MCG tablet, Take 500 mcg by mouth once daily., Disp: , Rfl:     estradiol (ESTRACE) 1 MG tablet, 1 mg. , Disp: , Rfl: 7    fluconazole (DIFLUCAN) 100 MG tablet, TAKE 1 TABLET (100 MG TOTAL) BY MOUTH ONCE., Disp: 5 tablet, Rfl: 3    furosemide (LASIX) 40 MG tablet, Take 1 tablet by mouth as needed., Disp: , Rfl:     guaiFENesin (HUMIBID E) 400 mg Tab, Take 400 mg by mouth., Disp: , Rfl:     immun glob G,IgG,/pro/IgA 0-50 (HIZENTRA SUBQ), Inject into the skin., Disp: , Rfl:     insulin glargine (LANTUS) 100 unit/mL injection, Inject 74 Units into the skin once daily. , Disp: , Rfl:     insulin syringe-needle U-100 (BD INSULIN SYRINGE ULTRA-FINE) 1 mL 31 x 5/16" Syrg, USE 3 TIMES DAILY, Disp: 100 each, Rfl: 6    levothyroxine (SYNTHROID) 100 MCG tablet, TAKE 1 TABLET (100 MCG TOTAL) BY MOUTH ONCE DAILY., Disp: 30 tablet, Rfl: 5    loratadine (CLARITIN) 10 mg tablet, Take 10 mg by mouth once daily., Disp: , Rfl:     MAGNESIUM ORAL, Take 500 tablets by mouth nightly., Disp: , Rfl:     meclizine (ANTIVERT) 12.5 mg tablet, TAKE 1 TABLET (12.5 MG TOTAL) BY MOUTH 3 (THREE) TIMES DAILY AS NEEDED. (Patient taking differently: TAKE 1 TABLET (12.5 MG TOTAL) BY MOUTH 2 TIMES DAILY AS NEEDED.), Disp: 90 tablet, Rfl: 0    mupirocin (BACTROBAN) 2 % ointment, , Disp: , Rfl:     NOVOLOG 100 unit/mL injection, Inject 15 Units into the skin 3 (three) times daily before meals. , Disp: , Rfl:     PATANOL 0.1 % ophthalmic solution, PLACE 1 DROP INTO BOTH EYES ONCE DAILY, Disp: 5 mL, Rfl: 3    pramipexole (MIRAPEX) 0.5 MG tablet, TAKE 3 TABLETS BY MOUTH IN P.M., Disp: 90 tablet, Rfl: 3    pregabalin (LYRICA) 50 MG capsule, Take 1 capsule by mouth 3 (three) times daily., Disp: , Rfl:     " pyridoxine, vitamin B6, (VITAMIN B-6) 100 MG Tab, Take 50 mg by mouth once daily., Disp: , Rfl:     rosuvastatin (CRESTOR) 5 MG tablet, Take 1 tablet (5 mg total) by mouth once daily., Disp: 90 tablet, Rfl: 1    selenium 200 mcg Cap, Take 200 mg by mouth once daily., Disp: 30 capsule, Rfl: 5    thiamine HCl (VITAMIN B-1) 500 MG tablet, Take 500 mg by mouth once daily., Disp: , Rfl:     TRUE METRIX GLUCOSE TEST STRIP Strp, by Alternating Nares route 4 (four) times daily as needed., Disp: , Rfl:     umeclidinium-vilanterol (ANORO ELLIPTA) 62.5-25 mcg/actuation DsDv, Inhale 1 puff into the lungs once daily., Disp: , Rfl:     valsartan (DIOVAN) 40 MG tablet, Take 40 mg by mouth once daily., Disp: , Rfl: 2    Review of Systems   Constitution: Positive for weight loss. Negative for chills, diaphoresis, fever, malaise/fatigue and night sweats.   HENT: Negative for congestion, hearing loss and nosebleeds.    Eyes: Negative for blurred vision and visual disturbance (MACULAR DEGENERATION).   Cardiovascular: Negative for chest pain, claudication, cyanosis, dyspnea on exertion (MILD), irregular heartbeat, leg swelling (MILD, B), near-syncope, orthopnea, palpitations, paroxysmal nocturnal dyspnea and syncope.   Respiratory: Negative for cough, hemoptysis, shortness of breath and wheezing.    Endocrine: Negative for cold intolerance and heat intolerance.        BG OK   Hematologic/Lymphatic: Negative for adenopathy and bleeding problem. Does not bruise/bleed easily.   Skin: Negative for color change, itching and rash.   Musculoskeletal: Negative for back pain (CHRONIC) and falls (NOW BETTER).   Gastrointestinal: Negative for abdominal pain, change in bowel habit, hematemesis, jaundice, melena and vomiting.        INCREASED ABD GIRTH   Genitourinary: Negative for dysuria, flank pain and frequency.        UTI'S   Neurological: Negative for brief paralysis, dizziness, focal weakness, light-headedness, loss of balance, tremors  "and weakness.   Psychiatric/Behavioral: Negative for altered mental status, depression, memory loss and substance abuse. The patient is not nervous/anxious.    Allergic/Immunologic: Negative for environmental allergies, hives and persistent infections.        Objective:      Vitals:    08/14/19 1548   BP: 120/62   Pulse: (!) 51   SpO2: 99%   Weight: 104.6 kg (230 lb 9.6 oz)   Height: 5' 7" (1.702 m)   PainSc:   4     Body mass index is 36.12 kg/m².    Physical Exam   Constitutional: She is oriented to person, place, and time. She appears well-developed and well-nourished. She is active.   OBESE,    HENT:   Head: Normocephalic and atraumatic.   Mouth/Throat: Oropharynx is clear and moist and mucous membranes are normal.   Eyes: Pupils are equal, round, and reactive to light. Conjunctivae and EOM are normal.   Neck: Trachea normal and normal range of motion. Neck supple. Normal carotid pulses, no hepatojugular reflux and no JVD present. Carotid bruit is not present. No edema and no erythema present. No thyromegaly present.   Cardiovascular: Normal rate and regular rhythm.  No extrasystoles are present. PMI is not displaced. Exam reveals no gallop and no friction rub.   Murmur heard.   Systolic murmur is present with a grade of 1/6 at the upper right sternal border.  Pulses:       Carotid pulses are 2+ on the right side, and 2+ on the left side.       Radial pulses are 2+ on the right side, and 2+ on the left side.        Posterior tibial pulses are 2+ on the right side, and 2+ on the left side.   TRACE EDEMA   Pulmonary/Chest: Effort normal and breath sounds normal. No tachypnea and no bradypnea. She has no wheezes. She exhibits no tenderness.   Abdominal: Soft. Bowel sounds are normal. She exhibits no distension and no mass. There is no hepatosplenomegaly.   Musculoskeletal: Normal range of motion. She exhibits no edema or tenderness.   SLOW GAIT, WALKER   Lymphadenopathy:     She has no cervical adenopathy. "   Neurological: She is alert and oriented to person, place, and time. She has normal strength. She displays no tremor. No cranial nerve deficit.   Skin: Skin is warm and dry. No erythema. No pallor (MILDLY).   Psychiatric: She has a normal mood and affect. Her speech is normal and behavior is normal.               ..    Chemistry        Component Value Date/Time     09/19/2018 0508    K 3.5 09/19/2018 0508     09/19/2018 0508    CO2 32 (H) 09/19/2018 0508    BUN 45 (H) 09/19/2018 0508    CREATININE 1.16 09/19/2018 0508     (H) 09/19/2018 0508        Component Value Date/Time    CALCIUM 8.5 09/19/2018 0508    ALKPHOS 68 09/19/2018 0508    AST 23 09/19/2018 0508    ALT 44 09/19/2018 0508    BILITOT 0.2 09/19/2018 0508    ESTGFRAFRICA 55 (A) 09/19/2018 0508    EGFRNONAA 48 (A) 09/19/2018 0508            ..  Lab Results   Component Value Date    CHOL 123 08/18/2016     Lab Results   Component Value Date    HDL 33 (L) 08/18/2016     Lab Results   Component Value Date    LDLCALC 54.4 (L) 08/18/2016     Lab Results   Component Value Date    TRIG 178 (H) 08/18/2016     Lab Results   Component Value Date    CHOLHDL 26.8 08/18/2016     ..  Lab Results   Component Value Date    WBC 5.70 09/19/2018    HGB 11.6 (L) 09/19/2018    HCT 36.6 (L) 09/19/2018    MCV 97 09/19/2018     (L) 09/19/2018       Test(s) Reviewed  I have reviewed the following in detail:  [] Stress test   [] Angiography   [] Echocardiogram   [x] Labs   [] Other:       Assessment:         ICD-10-CM ICD-9-CM   1. PAF (paroxysmal atrial fibrillation) I48.0 427.31   2. Congestive heart failure with LV diastolic dysfunction, NYHA class 2 I50.30 428.30     428.0   3. Coronary artery disease involving native coronary artery of native heart without angina pectoris I25.10 414.01   4. Long term (current) use of anticoagulants Z79.01 V58.61   5. Severe obesity (BMI 35.0-35.9 with comorbidity) E66.01 278.01    Z68.35 V85.35   6. Nonrheumatic  aortic valve stenosis I35.0 424.1     Problem List Items Addressed This Visit        Cardiac/Vascular    Coronary artery disease involving native coronary artery of native heart without angina pectoris (Chronic)    Relevant Orders    Comprehensive metabolic panel    Congestive heart failure with LV diastolic dysfunction, NYHA class 2    Relevant Orders    Comprehensive metabolic panel    PAF (paroxysmal atrial fibrillation) - Primary    Relevant Orders    Comprehensive metabolic panel    Nonrheumatic aortic valve stenosis    Relevant Orders    Transthoracic echo (TTE) 2D with Color Flow    Comprehensive metabolic panel       Hematology    Long term (current) use of anticoagulants    Relevant Orders    Comprehensive metabolic panel    Hemoglobin       Endocrine    Severe obesity (BMI 35.0-35.9 with comorbidity)    Relevant Orders    Comprehensive metabolic panel           Plan:         ALL CV CLINICALLY STABLE, NO ANGINA, CLASS 1-2 HF, NO TIA, NO CLINICAL ARRHYTHMIA,CONTINUE CURRENT MEDS, EDUCATION, DIET, EXERCISE, WEIGHT LOSS, IF INCREASED FALL MAY CONSIDER D SEEING ANTICOAGULATION HOWEVER THIS IS HAS IMPROVED LATELY, RETURN TO CLINIC IN 6 MO WITH LABS  PAF (paroxysmal atrial fibrillation)  -     Comprehensive metabolic panel; Future; Expected date: 08/14/2019    Congestive heart failure with LV diastolic dysfunction, NYHA class 2  -     Comprehensive metabolic panel; Future; Expected date: 08/14/2019    Coronary artery disease involving native coronary artery of native heart without angina pectoris  -     Comprehensive metabolic panel; Future; Expected date: 08/14/2019    Long term (current) use of anticoagulants  -     Comprehensive metabolic panel; Future; Expected date: 08/14/2019  -     Hemoglobin; Future; Expected date: 08/14/2019    Severe obesity (BMI 35.0-35.9 with comorbidity)  -     Comprehensive metabolic panel; Future; Expected date: 08/14/2019    Nonrheumatic aortic valve stenosis  -     Transthoracic  echo (TTE) 2D with Color Flow; Future  -     Comprehensive metabolic panel; Future; Expected date: 08/14/2019    Other orders  -     rosuvastatin (CRESTOR) 5 MG tablet; Take 1 tablet (5 mg total) by mouth once daily.  Dispense: 90 tablet; Refill: 1    RTC Low level/low impact aerobic exercise 5x's/wk. Heart healthy diet and risk factor modification.    See labs and med orders.    Aerobic exercise 5x's/wk. Heart healthy diet and risk factor modification.    See labs and med orders.

## 2019-11-18 ENCOUNTER — TELEPHONE (OUTPATIENT)
Dept: CARDIOLOGY | Facility: CLINIC | Age: 69
End: 2019-11-18

## 2019-11-18 NOTE — TELEPHONE ENCOUNTER
----- Message from Jessica Corcoran sent at 11/18/2019  9:44 AM CST -----  Contact: Suzanne Palacios from Dr. Meza's office called regarding patient, requesting a call back from nurse. Sending paperwork over today  [phone]

## 2019-11-18 NOTE — TELEPHONE ENCOUNTER
Spoke to Suzanne, she stated she is sending over a clearance today for this pt. Verbalized understanding and informed will send clearance back once received.

## 2019-11-19 ENCOUNTER — TELEPHONE (OUTPATIENT)
Dept: CARDIOLOGY | Facility: CLINIC | Age: 69
End: 2019-11-19

## 2019-11-19 NOTE — TELEPHONE ENCOUNTER
Spoke to Alida, she stated she had already spoken with the nurse after leaving this message and clearance is good.

## 2019-11-19 NOTE — TELEPHONE ENCOUNTER
----- Message from Garfield Valle sent at 11/19/2019  8:29 AM CST -----  Contact: Alida Meza  Type: Needs Medical Advice    Who Called:  Alida Hurtado Call Back Number: [phone]  Additional Information: Caller would like to discuss cardiac clearance. Please call to advise. Thanks!

## 2020-03-04 ENCOUNTER — OFFICE VISIT (OUTPATIENT)
Dept: CARDIOLOGY | Facility: CLINIC | Age: 70
End: 2020-03-04
Payer: MEDICARE

## 2020-03-04 VITALS
DIASTOLIC BLOOD PRESSURE: 52 MMHG | HEIGHT: 67 IN | SYSTOLIC BLOOD PRESSURE: 100 MMHG | WEIGHT: 224 LBS | BODY MASS INDEX: 35.16 KG/M2

## 2020-03-04 DIAGNOSIS — I48.0 PAF (PAROXYSMAL ATRIAL FIBRILLATION): ICD-10-CM

## 2020-03-04 DIAGNOSIS — I25.10 CORONARY ARTERY DISEASE INVOLVING NATIVE CORONARY ARTERY OF NATIVE HEART WITHOUT ANGINA PECTORIS: Primary | ICD-10-CM

## 2020-03-04 DIAGNOSIS — E66.01 SEVERE OBESITY (BMI 35.0-35.9 WITH COMORBIDITY): ICD-10-CM

## 2020-03-04 DIAGNOSIS — I10 ESSENTIAL HYPERTENSION: ICD-10-CM

## 2020-03-04 DIAGNOSIS — E78.00 PURE HYPERCHOLESTEROLEMIA: ICD-10-CM

## 2020-03-04 DIAGNOSIS — Z79.01 LONG TERM (CURRENT) USE OF ANTICOAGULANTS: ICD-10-CM

## 2020-03-04 DIAGNOSIS — I35.0 NONRHEUMATIC AORTIC VALVE STENOSIS: ICD-10-CM

## 2020-03-04 PROCEDURE — 3078F PR MOST RECENT DIASTOLIC BLOOD PRESSURE < 80 MM HG: ICD-10-PCS | Mod: CPTII,S$GLB,, | Performed by: INTERNAL MEDICINE

## 2020-03-04 PROCEDURE — 3074F SYST BP LT 130 MM HG: CPT | Mod: CPTII,S$GLB,, | Performed by: INTERNAL MEDICINE

## 2020-03-04 PROCEDURE — 1101F PT FALLS ASSESS-DOCD LE1/YR: CPT | Mod: CPTII,S$GLB,, | Performed by: INTERNAL MEDICINE

## 2020-03-04 PROCEDURE — 1159F PR MEDICATION LIST DOCUMENTED IN MEDICAL RECORD: ICD-10-PCS | Mod: S$GLB,,, | Performed by: INTERNAL MEDICINE

## 2020-03-04 PROCEDURE — 1159F MED LIST DOCD IN RCRD: CPT | Mod: S$GLB,,, | Performed by: INTERNAL MEDICINE

## 2020-03-04 PROCEDURE — 1101F PR PT FALLS ASSESS DOC 0-1 FALLS W/OUT INJ PAST YR: ICD-10-PCS | Mod: CPTII,S$GLB,, | Performed by: INTERNAL MEDICINE

## 2020-03-04 PROCEDURE — 3074F PR MOST RECENT SYSTOLIC BLOOD PRESSURE < 130 MM HG: ICD-10-PCS | Mod: CPTII,S$GLB,, | Performed by: INTERNAL MEDICINE

## 2020-03-04 PROCEDURE — 3078F DIAST BP <80 MM HG: CPT | Mod: CPTII,S$GLB,, | Performed by: INTERNAL MEDICINE

## 2020-03-04 PROCEDURE — 1125F AMNT PAIN NOTED PAIN PRSNT: CPT | Mod: S$GLB,,, | Performed by: INTERNAL MEDICINE

## 2020-03-04 PROCEDURE — 99214 PR OFFICE/OUTPT VISIT, EST, LEVL IV, 30-39 MIN: ICD-10-PCS | Mod: S$GLB,,, | Performed by: INTERNAL MEDICINE

## 2020-03-04 PROCEDURE — 1125F PR PAIN SEVERITY QUANTIFIED, PAIN PRESENT: ICD-10-PCS | Mod: S$GLB,,, | Performed by: INTERNAL MEDICINE

## 2020-03-04 PROCEDURE — 99214 OFFICE O/P EST MOD 30 MIN: CPT | Mod: S$GLB,,, | Performed by: INTERNAL MEDICINE

## 2020-03-04 RX ORDER — ROSUVASTATIN CALCIUM 5 MG/1
5 TABLET, COATED ORAL DAILY
Qty: 90 TABLET | Refills: 1 | Status: SHIPPED | OUTPATIENT
Start: 2020-03-04 | End: 2020-09-09 | Stop reason: SDUPTHER

## 2020-03-04 RX ORDER — NITROFURANTOIN 25; 75 MG/1; MG/1
100 CAPSULE ORAL
COMMUNITY
Start: 2020-02-26 | End: 2020-09-24 | Stop reason: CLARIF

## 2020-03-04 NOTE — PROGRESS NOTES
Subjective:    Patient ID:  Aimee Aquino is a 69 y.o. female who presents for Atrial Fibrillation and Hyperlipidemia        HPI  DISCUSSED LABS AND GOALS HB 12.3, CR 1.16, TO HAVE COLONOSCOPY, NO CHEST PAIN MORE D CONDITION, NO SIGNIFICANT SHORTNESS OF BREATH NO PALPITATION NO TIA TYPE SYMPTOMS NO NEAR-SYNCOPE, SEE ROS    Past Medical History:   Diagnosis Date    Anemia     will be starting iron infusions 2015    Anxiety     Aortic stenosis     Arthritis     Ascites     Atrial fibrillation     NOT SURE WHEN    Back pain, chronic     has spinal stimulator    Bladder spasms     Cancer     skin CA    CHF (congestive heart failure)     CKD (chronic kidney disease), stage III     not on dialysis    Coronary artery disease     Diabetes mellitus     Fibrocystic breast     Fibromyalgia     GERD (gastroesophageal reflux disease)     Gout     Heart murmur     History of urinary urgency     Hyperlipidemia     Hypertension     controlled with diuretic, sometimes hypotension    Neuropathy     Oxygen dependent     2 lpm most of the time    Restless legs syndrome     severe    Scoliosis     Shingles     Unsure of dates    Sleep apnea     CPAP, use with oxygen    Stenosis of aortic and mitral valves     Stroke     x3    Thyroid disease     hypothyroid    Venous insufficiency of leg     wears pumps on legs, boots    Vertigo     uses Antivert 3 times every day     Past Surgical History:   Procedure Laterality Date    APPENDECTOMY      BREAST BIOPSY Right     excisional over 20 yrs. neg liphoma    CARDIAC CATHETERIZATION  2010    no stents    CATARACT EXTRACTION W/  INTRAOCULAR LENS IMPLANT Bilateral     CHOLECYSTECTOMY      EYE SURGERY      cataracts removed    GASTRIC BYPASS  2010    HYSTERECTOMY      OOPHORECTOMY      PARTIAL HYSTERECTOMY  1980's they took one ovary only    [place] EXPLORATORY OF ABDOMEN      w/ RT salpingo-oopherectomy    SHOULDER SURGERY      left shoulder    SPINAL CORD  STIMULATOR IMPLANT      TONSILLECTOMY, ADENOIDECTOMY      TOTAL KNEE ARTHROPLASTY Bilateral     VASCULAR SURGERY  Sept 2014    EVLT saphenous, left leg     Family History   Problem Relation Age of Onset    Diabetes Mother     Stroke Mother     Heart disease Mother     Heart attack Father     Heart disease Father     Heart disease Sister     Cancer Maternal Aunt     Breast cancer Maternal Aunt         50's    Stroke Paternal Aunt     Diabetes Paternal Aunt     Heart disease Maternal Grandfather     Cancer Maternal Grandfather     Heart attack Paternal Grandfather     Cancer Maternal Aunt      Social History     Socioeconomic History    Marital status:      Spouse name: Not on file    Number of children: Not on file    Years of education: Not on file    Highest education level: Not on file   Occupational History    Not on file   Social Needs    Financial resource strain: Not on file    Food insecurity:     Worry: Not on file     Inability: Not on file    Transportation needs:     Medical: Not on file     Non-medical: Not on file   Tobacco Use    Smoking status: Former Smoker     Last attempt to quit: 1/20/2010     Years since quitting: 10.1    Smokeless tobacco: Never Used   Substance and Sexual Activity    Alcohol use: No    Drug use: No    Sexual activity: Never   Lifestyle    Physical activity:     Days per week: Not on file     Minutes per session: Not on file    Stress: Not on file   Relationships    Social connections:     Talks on phone: Not on file     Gets together: Not on file     Attends Zoroastrianism service: Not on file     Active member of club or organization: Not on file     Attends meetings of clubs or organizations: Not on file     Relationship status: Not on file   Other Topics Concern    Not on file   Social History Narrative    Not on file       Review of patient's allergies indicates:   Allergen Reactions    Iodinated contrast media Swelling     Lip and  face swelling  Lip and face swelling    Penicillins Blisters    Shellfish containing products Swelling    Adhesive tape-silicones Rash and Blisters     Use paper tape    Ambien [zolpidem] Other (See Comments)     sleepwalking    Codeine Rash    Demerol [meperidine] Rash    Penicillin g Rash     abcess  abcess    Sulfa (sulfonamide antibiotics) Rash    Sulfasalazine Rash    Tetracycline Rash       Current Outpatient Medications:     ACETAMINOPHEN (TYLENOL 8 HOUR ORAL), Take 1 tablet by mouth once daily., Disp: , Rfl:     allopurinol (ZYLOPRIM) 300 MG tablet, Take 1 tablet (300 mg total) by mouth once daily., Disp: 30 tablet, Rfl: 5    amitriptyline (ELAVIL) 100 MG tablet, Takes two tablets once a day, Disp: , Rfl: 5    amitriptyline (ELAVIL) 50 MG tablet, Take 50 mg by mouth every evening., Disp: , Rfl:     ammonium lactate (LAC-HYDRIN) 12 % lotion, Apply topically once daily., Disp: , Rfl:     apixaban (ELIQUIS) 2.5 mg Tab, Take 1 tablet (2.5 mg total) by mouth 2 (two) times daily., Disp: 180 tablet, Rfl: 1    ascorbic acid, vitamin C, (VITAMIN C) 1000 MG tablet, Take 1,000 mg by mouth once daily., Disp: , Rfl:     BD ALCOHOL SWABS PadM, , Disp: , Rfl:     biotin 2,500 mcg Cap, Take 5,000 mg by mouth once daily.  , Disp: , Rfl:     bumetanide (BUMEX) 0.5 MG Tab, TAKE 1 TABLET BY MOUTH EVERY MORNING MAY TAKE AN EXTRA DOSE IN PM FOR WEIGHT GAIN >3 LBS-2 DAYS (Patient taking differently: 1 mg. TAKE 1 TABLET BY MOUTH EVERY MORNING MAY TAKE AN EXTRA DOSE IN PM FOR WEIGHT GAIN >3 LBS-2 DAYS), Disp: 60 tablet, Rfl: 5    bumetanide (BUMEX) 1 MG tablet, TAKE 1 TABLET (1 MG TOTAL) BY MOUTH 2 (TWO) TIMES DAILY. (Patient taking differently: TAKE 1 TABLET (1 MG TOTAL) BY MOUTH DAILY.), Disp: 60 tablet, Rfl: 6    calcitonin, salmon, (FORTICAL) 200 unit/actuation nasal spray, 1 spray by Nasal route once daily., Disp: , Rfl:     cholecalciferol, vitamin D3, 5,000 unit capsule, Take 5,000 Units by mouth once  "daily. , Disp: , Rfl:     cyanocobalamin (VITAMIN B-12) 500 MCG tablet, Take 500 mcg by mouth once daily., Disp: , Rfl:     estradiol (ESTRACE) 1 MG tablet, 1 mg. , Disp: , Rfl: 7    fluconazole (DIFLUCAN) 100 MG tablet, TAKE 1 TABLET (100 MG TOTAL) BY MOUTH ONCE., Disp: 5 tablet, Rfl: 3    furosemide (LASIX) 40 MG tablet, Take 1 tablet by mouth as needed., Disp: , Rfl:     guaiFENesin (HUMIBID E) 400 mg Tab, Take 400 mg by mouth., Disp: , Rfl:     immun glob G,IgG,/pro/IgA 0-50 (HIZENTRA SUBQ), Inject into the skin., Disp: , Rfl:     insulin glargine (LANTUS) 100 unit/mL injection, Inject 50 Units into the skin once daily. , Disp: , Rfl:     insulin syringe-needle U-100 (BD INSULIN SYRINGE ULTRA-FINE) 1 mL 31 x 5/16" Syrg, USE 3 TIMES DAILY, Disp: 100 each, Rfl: 6    levothyroxine (SYNTHROID) 100 MCG tablet, TAKE 1 TABLET (100 MCG TOTAL) BY MOUTH ONCE DAILY., Disp: 30 tablet, Rfl: 5    MAGNESIUM ORAL, Take 500 tablets by mouth nightly., Disp: , Rfl:     meclizine (ANTIVERT) 12.5 mg tablet, TAKE 1 TABLET (12.5 MG TOTAL) BY MOUTH 3 (THREE) TIMES DAILY AS NEEDED. (Patient taking differently: TAKE 1 TABLET (12.5 MG TOTAL) BY MOUTH 2 TIMES DAILY AS NEEDED.), Disp: 90 tablet, Rfl: 0    mupirocin (BACTROBAN) 2 % ointment, , Disp: , Rfl:     nitrofurantoin, macrocrystal-monohydrate, (MACROBID) 100 MG capsule, , Disp: , Rfl:     NOVOLOG 100 unit/mL injection, Inject 15 Units into the skin 3 (three) times daily before meals. , Disp: , Rfl:     PATANOL 0.1 % ophthalmic solution, PLACE 1 DROP INTO BOTH EYES ONCE DAILY, Disp: 5 mL, Rfl: 3    pramipexole (MIRAPEX) 0.5 MG tablet, TAKE 3 TABLETS BY MOUTH IN P.M., Disp: 90 tablet, Rfl: 3    pregabalin (LYRICA) 50 MG capsule, Take 1 capsule by mouth 3 (three) times daily., Disp: , Rfl:     pyridoxine, vitamin B6, (VITAMIN B-6) 100 MG Tab, Take 50 mg by mouth once daily., Disp: , Rfl:     rosuvastatin (CRESTOR) 5 MG tablet, Take 1 tablet (5 mg total) by mouth once " daily., Disp: 90 tablet, Rfl: 1    selenium 200 mcg Cap, Take 200 mg by mouth once daily., Disp: 30 capsule, Rfl: 5    thiamine HCl (VITAMIN B-1) 500 MG tablet, Take 500 mg by mouth once daily., Disp: , Rfl:     TRUE METRIX GLUCOSE TEST STRIP Strp, by Alternating Nares route 4 (four) times daily as needed., Disp: , Rfl:     umeclidinium-vilanterol (ANORO ELLIPTA) 62.5-25 mcg/actuation DsDv, Inhale 1 puff into the lungs once daily., Disp: , Rfl:     valsartan (DIOVAN) 320 MG tablet, Take 320 mg by mouth once daily. , Disp: , Rfl: 2    loratadine (CLARITIN) 10 mg tablet, Take 10 mg by mouth once daily., Disp: , Rfl:     Review of Systems   Constitution: Negative for chills, diaphoresis, fever, malaise/fatigue and night sweats.   HENT: Negative for congestion, hearing loss and nosebleeds.    Eyes: Negative for blurred vision and visual disturbance (MACULAR DEGENERATION).   Cardiovascular: Negative for chest pain, claudication, cyanosis, dyspnea on exertion (MILD), irregular heartbeat, leg swelling (MILD, B), near-syncope, orthopnea, palpitations, paroxysmal nocturnal dyspnea and syncope.   Respiratory: Negative for cough, hemoptysis, shortness of breath and wheezing.    Endocrine: Negative for cold intolerance and heat intolerance.        BG OK   Hematologic/Lymphatic: Negative for adenopathy and bleeding problem. Does not bruise/bleed easily.   Skin: Negative for color change, itching and rash.   Musculoskeletal: Negative for back pain (CHRONIC) and falls.   Gastrointestinal: Negative for abdominal pain, change in bowel habit, hematemesis, jaundice, melena and vomiting.        INCREASED ABD GIRTH   Genitourinary: Negative for dysuria, flank pain and frequency.        UTI'S   Neurological: Negative for brief paralysis, dizziness, focal weakness, light-headedness, loss of balance, tremors and weakness.   Psychiatric/Behavioral: Negative for altered mental status, depression and memory loss.   Allergic/Immunologic:  "Negative for environmental allergies and persistent infections.        Objective:      Vitals:    03/04/20 1511   BP: (!) 100/52   Weight: 101.6 kg (223 lb 15.8 oz)   Height: 5' 7" (1.702 m)   PainSc: 10-Worst pain ever     Body mass index is 35.08 kg/m².    Physical Exam   Constitutional: She is oriented to person, place, and time. She appears well-developed and well-nourished. She is active.   OBESE,    HENT:   Head: Normocephalic and atraumatic.   Mouth/Throat: Oropharynx is clear and moist and mucous membranes are normal.   Eyes: Pupils are equal, round, and reactive to light. Conjunctivae and EOM are normal.   Neck: Trachea normal and normal range of motion. Neck supple. Normal carotid pulses, no hepatojugular reflux and no JVD present. Carotid bruit is not present. No edema and no erythema present. No thyromegaly present.   Cardiovascular: Normal rate and regular rhythm.  No extrasystoles are present. PMI is not displaced. Exam reveals no gallop and no friction rub.   Murmur heard.   Systolic murmur is present with a grade of 1/6 at the upper right sternal border.  Pulses:       Carotid pulses are 2+ on the right side, and 2+ on the left side.       Radial pulses are 2+ on the right side, and 2+ on the left side.        Posterior tibial pulses are 2+ on the right side, and 2+ on the left side.   TRACE EDEMA   Pulmonary/Chest: Effort normal and breath sounds normal. No tachypnea and no bradypnea. She has no wheezes. She exhibits no tenderness.   Abdominal: Soft. Bowel sounds are normal. She exhibits no distension and no mass. There is no hepatosplenomegaly.   Musculoskeletal: Normal range of motion. She exhibits no edema or tenderness.   SLOW GAIT, USES A WALKER   Lymphadenopathy:     She has no cervical adenopathy.   Neurological: She is alert and oriented to person, place, and time. She has normal strength. She displays no tremor. No cranial nerve deficit.   Skin: Skin is warm and dry. No erythema. No pallor " (MILDLY).   Psychiatric: She has a normal mood and affect. Her speech is normal and behavior is normal.               ..    Chemistry        Component Value Date/Time     09/19/2018 0508    K 3.5 09/19/2018 0508     09/19/2018 0508    CO2 32 (H) 09/19/2018 0508    BUN 45 (H) 09/19/2018 0508    CREATININE 1.16 09/19/2018 0508     (H) 09/19/2018 0508        Component Value Date/Time    CALCIUM 8.5 09/19/2018 0508    ALKPHOS 68 09/19/2018 0508    AST 23 09/19/2018 0508    ALT 44 09/19/2018 0508    BILITOT 0.2 09/19/2018 0508    ESTGFRAFRICA 55 (A) 09/19/2018 0508    EGFRNONAA 48 (A) 09/19/2018 0508            ..  Lab Results   Component Value Date    CHOL 123 08/18/2016     Lab Results   Component Value Date    HDL 33 (L) 08/18/2016     Lab Results   Component Value Date    LDLCALC 54.4 (L) 08/18/2016     Lab Results   Component Value Date    TRIG 178 (H) 08/18/2016     Lab Results   Component Value Date    CHOLHDL 26.8 08/18/2016     ..  Lab Results   Component Value Date    WBC 5.70 09/19/2018    HGB 11.6 (L) 09/19/2018    HCT 36.6 (L) 09/19/2018    MCV 97 09/19/2018     (L) 09/19/2018       Test(s) Reviewed  I have reviewed the following in detail:  [] Stress test   [] Angiography   [] Echocardiogram   [x] Labs   [] Other:       Assessment:         ICD-10-CM ICD-9-CM   1. Coronary artery disease involving native coronary artery of native heart without angina pectoris I25.10 414.01   2. PAF (paroxysmal atrial fibrillation) I48.0 427.31   3. Essential hypertension I10 401.9   4. Severe obesity (BMI 35.0-35.9 with comorbidity) E66.01 278.01    Z68.35 V85.35   5. Nonrheumatic aortic valve stenosis I35.0 424.1   6. Long term (current) use of anticoagulants Z79.01 V58.61   7. Pure hypercholesterolemia E78.00 272.0     Problem List Items Addressed This Visit        Cardiac/Vascular    Coronary artery disease involving native coronary artery of native heart without angina pectoris - Primary  (Chronic)    Relevant Orders    Echo Color Flow Doppler? Yes    Essential hypertension    Pure hypercholesterolemia    Relevant Orders    Lipid panel    PAF (paroxysmal atrial fibrillation)    Nonrheumatic aortic valve stenosis    Relevant Orders    Echo Color Flow Doppler? Yes       Hematology    Long term (current) use of anticoagulants       Endocrine    Severe obesity (BMI 35.0-35.9 with comorbidity)           Plan:     CHECK ECHO REASSESS AORTIC STENOSIS, ALL CV CLINICALLY RELATIVELY STABLE, NO ANGINA, NO HF, NO TIA, NO CLINICAL ARRHYTHMIA,CONTINUE CURRENT MEDS, EDUCATION, DIET, EXERCISE, WEIGHT LOSS, RETURN TO CLINIC IN 6 MONTHS WITH LABS      Coronary artery disease involving native coronary artery of native heart without angina pectoris  -     Echo Color Flow Doppler? Yes    PAF (paroxysmal atrial fibrillation)    Essential hypertension    Severe obesity (BMI 35.0-35.9 with comorbidity)    Nonrheumatic aortic valve stenosis  -     Echo Color Flow Doppler? Yes    Long term (current) use of anticoagulants    Pure hypercholesterolemia  -     Lipid panel; Future; Expected date: 03/04/2020    Other orders  -     rosuvastatin (CRESTOR) 5 MG tablet; Take 1 tablet (5 mg total) by mouth once daily.  Dispense: 90 tablet; Refill: 1  -     apixaban (ELIQUIS) 2.5 mg Tab; Take 1 tablet (2.5 mg total) by mouth 2 (two) times daily.  Dispense: 180 tablet; Refill: 1    RTC Low level/low impact aerobic exercise 5x's/wk. Heart healthy diet and risk factor modification.    See labs and med orders.    Aerobic exercise 5x's/wk. Heart healthy diet and risk factor modification.    See labs and med orders.

## 2020-06-10 ENCOUNTER — TELEPHONE (OUTPATIENT)
Dept: CARDIOLOGY | Facility: CLINIC | Age: 70
End: 2020-06-10

## 2020-06-10 NOTE — TELEPHONE ENCOUNTER
----- Message from Garfield Valle sent at 6/10/2020  1:18 PM CDT -----  Contact: Patient  Type: Needs Medical Advice    Who Called:  Patient  Best Call Back Number: [phone]  Additional Information: Patient would like to discuss US orders. Please call to advise. Thanks!

## 2020-06-11 ENCOUNTER — TELEPHONE (OUTPATIENT)
Dept: CARDIOLOGY | Facility: CLINIC | Age: 70
End: 2020-06-11

## 2020-06-11 NOTE — TELEPHONE ENCOUNTER
----- Message from Eboni Luis sent at 6/11/2020 12:03 PM CDT -----  Contact: Aimee desai  Type: Needs Medical Advice  Who Called:  Aimee Hurtado Call Back Number: [phone]  Additional Information: Pls call pt regarding orders for an US

## 2020-06-11 NOTE — TELEPHONE ENCOUNTER
Spoke to pt and scheduled echo for Thursday 6/18 at 1pm in [place]. Pt verbalized understanding.

## 2020-06-15 RX ORDER — AMLODIPINE BESYLATE 2.5 MG/1
2.5 TABLET ORAL DAILY
Qty: 30 TABLET | Refills: 2 | Status: SHIPPED | OUTPATIENT
Start: 2020-06-15 | End: 2020-08-04 | Stop reason: SDUPTHER

## 2020-06-15 NOTE — TELEPHONE ENCOUNTER
Please advise: pt stated her BP went up to 190/92 on Friday & she took an extra valsartan and got it down to 160/62, but that was as low as she could get it. Stated yesterday the same thing happened & today her BP at 7am was 127/68, 10am it was 149/84. No other symptoms besides some weakness.

## 2020-06-15 NOTE — TELEPHONE ENCOUNTER
----- Message from Mckenzie Holm sent at 6/15/2020 11:14 AM CDT -----  Type: Needs Medical Advice  Who Called:  Patient  Symptoms (please be specific):  blood pressure fluctuating  How long has patient had these symptoms:  since 6/12/20  Pharmacy name and phone #:      [id number] - [place] - [address]  [address]  [place] [address]  Phone: [phone] Fax: [phone]     Best Call Back Number: [phone]  Additional Information: Please call to advise.

## 2020-06-18 ENCOUNTER — CLINICAL SUPPORT (OUTPATIENT)
Dept: CARDIOLOGY | Facility: CLINIC | Age: 70
End: 2020-06-18
Attending: INTERNAL MEDICINE
Payer: MEDICARE

## 2020-06-18 VITALS — WEIGHT: 224.88 LBS | BODY MASS INDEX: 35.29 KG/M2 | HEART RATE: 69 BPM | HEIGHT: 67 IN

## 2020-06-18 LAB
ASCENDING AORTA: 2.86 CM
AV INDEX (PROSTH): 0.31
AV MEAN GRADIENT: 33 MMHG
AV PEAK GRADIENT: 50 MMHG
AV VALVE AREA: 0.97 CM2
AV VELOCITY RATIO: 0.4
BSA FOR ECHO PROCEDURE: 2.2 M2
CV ECHO LV RWT: 0.54 CM
DOP CALC AO PEAK VEL: 3.53 M/S
DOP CALC AO VTI: 92.97 CM
DOP CALC LVOT AREA: 3.1 CM2
DOP CALC LVOT DIAMETER: 1.98 CM
DOP CALC LVOT PEAK VEL: 1.4 M/S
DOP CALC LVOT STROKE VOLUME: 89.83 CM3
DOP CALCLVOT PEAK VEL VTI: 29.19 CM
E WAVE DECELERATION TIME: 329.79 MSEC
E/A RATIO: 0.96
E/E' RATIO: 6.93 M/S
ECHO LV POSTERIOR WALL: 1.24 CM (ref 0.6–1.1)
FRACTIONAL SHORTENING: 36 % (ref 28–44)
INTERVENTRICULAR SEPTUM: 1.27 CM (ref 0.6–1.1)
IVRT: 148.79 MSEC
LA MAJOR: 6.16 CM
LA MINOR: 6.4 CM
LA WIDTH: 4.65 CM
LEFT ATRIUM SIZE: 4.32 CM
LEFT ATRIUM VOLUME INDEX: 50.4 ML/M2
LEFT ATRIUM VOLUME: 107.19 CM3
LEFT INTERNAL DIMENSION IN SYSTOLE: 2.94 CM (ref 2.1–4)
LEFT VENTRICLE DIASTOLIC VOLUME INDEX: 45.49 ML/M2
LEFT VENTRICLE DIASTOLIC VOLUME: 96.68 ML
LEFT VENTRICLE MASS INDEX: 103 G/M2
LEFT VENTRICLE SYSTOLIC VOLUME INDEX: 15.7 ML/M2
LEFT VENTRICLE SYSTOLIC VOLUME: 33.37 ML
LEFT VENTRICULAR INTERNAL DIMENSION IN DIASTOLE: 4.59 CM (ref 3.5–6)
LEFT VENTRICULAR MASS: 217.93 G
LV LATERAL E/E' RATIO: 7.46 M/S
LV SEPTAL E/E' RATIO: 6.47 M/S
MV PEAK A VEL: 1.01 M/S
MV PEAK E VEL: 0.97 M/S
MV STENOSIS PRESSURE HALF TIME: 95.64 MS
MV VALVE AREA P 1/2 METHOD: 2.3 CM2
PISA TR MAX VEL: 2.83 M/S
PULM VEIN S/D RATIO: 0.53
PV PEAK D VEL: 0.92 M/S
PV PEAK S VEL: 0.49 M/S
PV PEAK VELOCITY: 1.15 CM/S
RA MAJOR: 5.61 CM
RA PRESSURE: 3 MMHG
RA WIDTH: 1.62 CM
RIGHT VENTRICULAR END-DIASTOLIC DIMENSION: 2.4 CM
SINUS: 2.35 CM
STJ: 2.69 CM
TDI LATERAL: 0.13 M/S
TDI SEPTAL: 0.15 M/S
TDI: 0.14 M/S
TR MAX PG: 32 MMHG
TRICUSPID ANNULAR PLANE SYSTOLIC EXCURSION: 2.07 CM
TV REST PULMONARY ARTERY PRESSURE: 35 MMHG

## 2020-06-22 ENCOUNTER — TELEPHONE (OUTPATIENT)
Dept: CARDIOLOGY | Facility: CLINIC | Age: 70
End: 2020-06-22

## 2020-07-08 RX ORDER — BUMETANIDE 0.5 MG/1
TABLET ORAL
Qty: 60 TABLET | Refills: 5 | OUTPATIENT
Start: 2020-07-08

## 2020-07-08 RX ORDER — FUROSEMIDE 40 MG/1
40 TABLET ORAL
Qty: 30 TABLET | Refills: 5 | Status: SHIPPED | OUTPATIENT
Start: 2020-07-08 | End: 2021-12-02

## 2020-07-08 RX ORDER — BUMETANIDE 1 MG/1
TABLET ORAL
Qty: 60 TABLET | Refills: 1 | Status: SHIPPED | OUTPATIENT
Start: 2020-07-08 | End: 2020-07-23 | Stop reason: SDUPTHER

## 2020-07-08 NOTE — TELEPHONE ENCOUNTER
----- Message from Naoim Strauss sent at 7/8/2020  4:12 PM CDT -----  Contact: pt  Type: Needs Medical Advice    Who Called:  Pt  Best Call Back Number: [phone]  Additional Information: Requesting a call back regarding wanted to talk about pt medication   Please Advise ---Thank you

## 2020-07-23 ENCOUNTER — TELEPHONE (OUTPATIENT)
Dept: CARDIOLOGY | Facility: CLINIC | Age: 70
End: 2020-07-23

## 2020-07-23 RX ORDER — BUMETANIDE 1 MG/1
TABLET ORAL
Qty: 60 TABLET | Refills: 0 | Status: SHIPPED | OUTPATIENT
Start: 2020-07-23 | End: 2020-08-21

## 2020-07-23 NOTE — TELEPHONE ENCOUNTER
----- Message from Bettina Darin sent at 7/23/2020 10:39 AM CDT -----  Regarding: refill  Contact: pt  Type: Needs Medical Advice    Who Called:  pt    Best Call Back Number:     Additional Information: Requesting a call back from Nurse, regarding refill for Rx bumetanide (BUMEX) 1 MG tablet 60 tablet and Rx bumetanide (BUMEX) 0.5 MG Tab 60 tablet  pt never received meds please call in ,pt states that she needs a local supply send it until she get meds  and would like a call from Nurse ,please call

## 2020-07-23 NOTE — TELEPHONE ENCOUNTER
Pt called and states that for the last week she has been very weak. States that BP has been running in the 160's/80's.  Please advise

## 2020-07-23 NOTE — TELEPHONE ENCOUNTER
Pt states for that last week she has been very weak. States that BP has been running in the 160's/80's.  Please advise

## 2020-07-23 NOTE — TELEPHONE ENCOUNTER
----- Message from Kapil Bustamante LPN sent at 7/23/2020  5:09 PM CDT -----    ----- Message -----  From: Bridger Wei  Sent: 7/23/2020   4:20 PM CDT  To: Chey YOUNG Staff    Type: Needs Medical Advice  Who Called:  Patient  Symptoms (please be specific):  BP-168/84  How long has patient had these symptoms:  A few weeks    Pharmacy name and phone #:    [id number] - [place] - [address]  [address] [place] [address] AVE.  [place] [address]  Phone: [phone] Fax: [phone]      Best Call Back Number: [phone]  Additional Information: Please call to advise

## 2020-07-24 ENCOUNTER — TELEPHONE (OUTPATIENT)
Dept: CARDIOLOGY | Facility: CLINIC | Age: 70
End: 2020-07-24

## 2020-07-24 NOTE — TELEPHONE ENCOUNTER
----- Message from Brigid Wong sent at 7/24/2020 10:39 AM CDT -----  Name of Who is Calling: AUDI ZELAYA [[id number]]      What is the request in detail: Pt is calling to speak to staff in regards to a missed call... Please call to further assist .       Can the clinic reply by [facility]: N       [facility]: [phone]

## 2020-07-24 NOTE — TELEPHONE ENCOUNTER
Called to schedule appt with Dr cloud or Kylie, whichever first available. Spoke with daughter who states she will have pt call back to schedule.

## 2020-07-28 ENCOUNTER — OFFICE VISIT (OUTPATIENT)
Dept: CARDIOLOGY | Facility: CLINIC | Age: 70
End: 2020-07-28
Payer: MEDICARE

## 2020-07-28 VITALS
SYSTOLIC BLOOD PRESSURE: 127 MMHG | HEART RATE: 61 BPM | BODY MASS INDEX: 35.54 KG/M2 | WEIGHT: 226.44 LBS | HEIGHT: 67 IN | DIASTOLIC BLOOD PRESSURE: 66 MMHG

## 2020-07-28 DIAGNOSIS — I48.91 ATRIAL FIBRILLATION, UNSPECIFIED TYPE: ICD-10-CM

## 2020-07-28 DIAGNOSIS — N18.30 CKD (CHRONIC KIDNEY DISEASE) STAGE 3, GFR 30-59 ML/MIN: Chronic | ICD-10-CM

## 2020-07-28 DIAGNOSIS — N18.30 TYPE 2 DIABETES MELLITUS WITH STAGE 3 CHRONIC KIDNEY DISEASE, WITH LONG-TERM CURRENT USE OF INSULIN: Chronic | ICD-10-CM

## 2020-07-28 DIAGNOSIS — I10 ESSENTIAL HYPERTENSION: ICD-10-CM

## 2020-07-28 DIAGNOSIS — G47.33 OBSTRUCTIVE SLEEP APNEA OF ADULT: ICD-10-CM

## 2020-07-28 DIAGNOSIS — I50.30 CONGESTIVE HEART FAILURE WITH LV DIASTOLIC DYSFUNCTION, NYHA CLASS 2: ICD-10-CM

## 2020-07-28 DIAGNOSIS — Z86.73 HISTORY OF CVA (CEREBROVASCULAR ACCIDENT): Primary | ICD-10-CM

## 2020-07-28 DIAGNOSIS — I35.0 NONRHEUMATIC AORTIC VALVE STENOSIS: ICD-10-CM

## 2020-07-28 DIAGNOSIS — I77.1 TORTUOUS AORTA: ICD-10-CM

## 2020-07-28 DIAGNOSIS — I25.10 CORONARY ARTERY DISEASE INVOLVING NATIVE CORONARY ARTERY OF NATIVE HEART WITHOUT ANGINA PECTORIS: Chronic | ICD-10-CM

## 2020-07-28 DIAGNOSIS — E11.22 TYPE 2 DIABETES MELLITUS WITH STAGE 3 CHRONIC KIDNEY DISEASE, WITH LONG-TERM CURRENT USE OF INSULIN: Chronic | ICD-10-CM

## 2020-07-28 DIAGNOSIS — Z79.4 TYPE 2 DIABETES MELLITUS WITH STAGE 3 CHRONIC KIDNEY DISEASE, WITH LONG-TERM CURRENT USE OF INSULIN: Chronic | ICD-10-CM

## 2020-07-28 DIAGNOSIS — G62.9 PERIPHERAL POLYNEUROPATHY: ICD-10-CM

## 2020-07-28 PROCEDURE — 1101F PR PT FALLS ASSESS DOC 0-1 FALLS W/OUT INJ PAST YR: ICD-10-PCS | Mod: CPTII,S$GLB,, | Performed by: PHYSICIAN ASSISTANT

## 2020-07-28 PROCEDURE — 99999 PR PBB SHADOW E&M-EST. PATIENT-LVL V: CPT | Mod: PBBFAC,,, | Performed by: PHYSICIAN ASSISTANT

## 2020-07-28 PROCEDURE — 3078F PR MOST RECENT DIASTOLIC BLOOD PRESSURE < 80 MM HG: ICD-10-PCS | Mod: CPTII,S$GLB,, | Performed by: PHYSICIAN ASSISTANT

## 2020-07-28 PROCEDURE — 99214 OFFICE O/P EST MOD 30 MIN: CPT | Mod: S$GLB,,, | Performed by: PHYSICIAN ASSISTANT

## 2020-07-28 PROCEDURE — 99999 PR PBB SHADOW E&M-EST. PATIENT-LVL V: ICD-10-PCS | Mod: PBBFAC,,, | Performed by: PHYSICIAN ASSISTANT

## 2020-07-28 PROCEDURE — 1159F PR MEDICATION LIST DOCUMENTED IN MEDICAL RECORD: ICD-10-PCS | Mod: S$GLB,,, | Performed by: PHYSICIAN ASSISTANT

## 2020-07-28 PROCEDURE — 1125F PR PAIN SEVERITY QUANTIFIED, PAIN PRESENT: ICD-10-PCS | Mod: S$GLB,,, | Performed by: PHYSICIAN ASSISTANT

## 2020-07-28 PROCEDURE — 3074F PR MOST RECENT SYSTOLIC BLOOD PRESSURE < 130 MM HG: ICD-10-PCS | Mod: CPTII,S$GLB,, | Performed by: PHYSICIAN ASSISTANT

## 2020-07-28 PROCEDURE — 3078F DIAST BP <80 MM HG: CPT | Mod: CPTII,S$GLB,, | Performed by: PHYSICIAN ASSISTANT

## 2020-07-28 PROCEDURE — 1101F PT FALLS ASSESS-DOCD LE1/YR: CPT | Mod: CPTII,S$GLB,, | Performed by: PHYSICIAN ASSISTANT

## 2020-07-28 PROCEDURE — 3008F PR BODY MASS INDEX (BMI) DOCUMENTED: ICD-10-PCS | Mod: CPTII,S$GLB,, | Performed by: PHYSICIAN ASSISTANT

## 2020-07-28 PROCEDURE — 3008F BODY MASS INDEX DOCD: CPT | Mod: CPTII,S$GLB,, | Performed by: PHYSICIAN ASSISTANT

## 2020-07-28 PROCEDURE — 1125F AMNT PAIN NOTED PAIN PRSNT: CPT | Mod: S$GLB,,, | Performed by: PHYSICIAN ASSISTANT

## 2020-07-28 PROCEDURE — 1159F MED LIST DOCD IN RCRD: CPT | Mod: S$GLB,,, | Performed by: PHYSICIAN ASSISTANT

## 2020-07-28 PROCEDURE — 99214 PR OFFICE/OUTPT VISIT, EST, LEVL IV, 30-39 MIN: ICD-10-PCS | Mod: S$GLB,,, | Performed by: PHYSICIAN ASSISTANT

## 2020-07-28 PROCEDURE — 3074F SYST BP LT 130 MM HG: CPT | Mod: CPTII,S$GLB,, | Performed by: PHYSICIAN ASSISTANT

## 2020-07-28 RX ORDER — HYDROMORPHONE HYDROCHLORIDE 4 MG/1
4 TABLET ORAL EVERY 4 HOURS PRN
COMMUNITY

## 2020-07-28 RX ORDER — VALSARTAN 160 MG/1
160 TABLET ORAL DAILY
Qty: 90 TABLET | Refills: 3 | Status: SHIPPED | OUTPATIENT
Start: 2020-07-28 | End: 2020-09-09 | Stop reason: SDUPTHER

## 2020-07-28 NOTE — PROGRESS NOTES
Subjective:    Patient ID:  Aimee Aquino is a 70 y.o. female who presents for follow-up of   HTN.     HPI  Ms. Aquino is a pleasant lady who follows with Dr. Guerrier. She presents today with c/o increased blood pressures at home. She is eager to get her blood pressure controlled because she is scheduled to have a generator change later this month for her spinal stimulator. She was prescribed amlodipine 2.5mg in June because her BP was not well controlled. She states her nephrologist increased the dose to 2.5mg BID, but she was unable to tolerate the increased dose due to abdominal swelling. She is now on only 2.5mg in the morning. She states her BP increases in the evenings. Upon review of her medications, it appears she was under the impression the norvasc was a replacement for the Diovan, so she quit taking it in June when she started the amlodipine.     Review of Systems   Constitution: Negative for chills, diaphoresis, fever, weight gain and weight loss.   HENT: Negative for sore throat.    Eyes: Negative for blurred vision, vision loss in left eye, vision loss in right eye and visual disturbance.   Cardiovascular: Positive for leg swelling. Negative for chest pain, claudication, dyspnea on exertion, near-syncope, orthopnea, palpitations, paroxysmal nocturnal dyspnea and syncope.   Respiratory: Negative for cough, hemoptysis, shortness of breath, sputum production and wheezing.    Endocrine: Negative for cold intolerance and heat intolerance.   Hematologic/Lymphatic: Negative for adenopathy. Does not bruise/bleed easily.   Skin: Negative for rash.   Musculoskeletal: Positive for back pain. Negative for falls, muscle weakness and myalgias.   Gastrointestinal: Positive for bloating. Negative for abdominal pain, change in bowel habit, constipation, diarrhea, melena and nausea.   Genitourinary: Negative for bladder incontinence.   Neurological: Negative for dizziness, focal weakness, headaches, light-headedness,  "numbness and weakness.   Psychiatric/Behavioral: Negative for altered mental status.         Vitals:    07/28/20 1514   BP: 127/66   BP Location: Left arm   Patient Position: Sitting   BP Method: Large (Automatic)   Pulse: 61   Weight: 102.7 kg (226 lb 6.6 oz)   Height: 5' 7" (1.702 m)   Body mass index is 35.46 kg/m².    Objective:    Physical Exam   Constitutional: She is oriented to person, place, and time. She appears well-developed and well-nourished. No distress.   HENT:   Head: Normocephalic and atraumatic.   Mouth/Throat: Oropharynx is clear and moist.   Eyes: Pupils are equal, round, and reactive to light. Conjunctivae and EOM are normal. No scleral icterus.   Neck: Neck supple. No JVD present. No tracheal deviation present.   Cardiovascular: Normal rate and regular rhythm. Exam reveals no gallop and no friction rub.   No murmur heard.  Pulmonary/Chest: Effort normal and breath sounds normal. No respiratory distress. She has no wheezes. She has no rales. She exhibits no tenderness.   Abdominal: Soft. Bowel sounds are normal. She exhibits distension. There is no hepatosplenomegaly. There is no abdominal tenderness.   Musculoskeletal:         General: Edema (Trace BLE) present. No tenderness.   Neurological: She is alert and oriented to person, place, and time.   Skin: Skin is warm and dry. No rash noted. No erythema.   Psychiatric: She has a normal mood and affect. Her behavior is normal.         Assessment:       Problem List Items Addressed This Visit        Cardiology Problems    A-fib    Congestive heart failure with LV diastolic dysfunction, NYHA class 2    Coronary artery disease involving native coronary artery of native heart without angina pectoris (Chronic)    Essential hypertension    Nonrheumatic aortic valve stenosis    Tortuous aorta       Other    CKD (chronic kidney disease) stage 3, GFR 30-59 ml/min (Chronic)    History of CVA (cerebrovascular accident) - Primary    Obstructive sleep apnea " of adult    Peripheral neuropathy    Type 2 diabetes mellitus (Chronic)             Plan:       Resume valsartan at decreased dose of 160mg daily.  Continue amlodipine.   Volume management per Nephrology. Instructed her to get a new scale and resume daily weights.   Continue other cardiac medications.   F/U with Dr. Guerrier as scheduled.

## 2020-08-04 RX ORDER — AMLODIPINE BESYLATE 2.5 MG/1
2.5 TABLET ORAL DAILY
Qty: 90 TABLET | Refills: 0 | Status: SHIPPED | OUTPATIENT
Start: 2020-08-04 | End: 2020-09-09 | Stop reason: SDUPTHER

## 2020-08-04 NOTE — TELEPHONE ENCOUNTER
----- Message from Payal Joshi MA sent at 8/4/2020 11:02 AM CDT -----  Type:  RX Refill Request    Who Called:  Aimee  Refill or New Rx:  refill  RX Name and Strength:    amLODIPine (NORVASC) 2.5 MG tablet 30 tablet 2 6/15/2020 6/15/2021   Sig - Route: Take 1 tablet (2.5 mg total) by mouth once daily. - Oral   How is the patient currently taking it? (ex. 1XDay):  See Above  Is this a 30 day or 90 day RX:  90  Preferred Pharmacy with phone number:    [facility]/pharmacy #[id number] - [place] - [address]  [address]  [place] [address]  Phone: [phone] Fax: [phone]  Local or Mail Order:  local  Ordering Provider:    Best Call Back Number:  [phone]  Additional Information:  patient states she is out of her medication.  She states she is taking 2/day.  She ran out early.  Please call to discuss

## 2020-09-09 ENCOUNTER — OFFICE VISIT (OUTPATIENT)
Dept: CARDIOLOGY | Facility: CLINIC | Age: 70
End: 2020-09-09
Payer: MEDICARE

## 2020-09-09 VITALS
HEIGHT: 67 IN | DIASTOLIC BLOOD PRESSURE: 66 MMHG | HEART RATE: 76 BPM | WEIGHT: 227.5 LBS | SYSTOLIC BLOOD PRESSURE: 114 MMHG | BODY MASS INDEX: 35.71 KG/M2

## 2020-09-09 DIAGNOSIS — Z03.818 ENCNTR FOR OBS FOR SUSP EXPSR TO OTH BIOLG AGENTS RULED OUT: ICD-10-CM

## 2020-09-09 DIAGNOSIS — I35.0 SEVERE AORTIC STENOSIS: Primary | ICD-10-CM

## 2020-09-09 DIAGNOSIS — I70.0 THORACIC AORTA ATHEROSCLEROSIS: ICD-10-CM

## 2020-09-09 DIAGNOSIS — N18.30 CKD (CHRONIC KIDNEY DISEASE) STAGE 3, GFR 30-59 ML/MIN: Chronic | ICD-10-CM

## 2020-09-09 DIAGNOSIS — I77.1 TORTUOUS AORTA: ICD-10-CM

## 2020-09-09 DIAGNOSIS — E66.01 SEVERE OBESITY (BMI 35.0-35.9 WITH COMORBIDITY): ICD-10-CM

## 2020-09-09 DIAGNOSIS — I50.30 CONGESTIVE HEART FAILURE WITH LV DIASTOLIC DYSFUNCTION, NYHA CLASS 2: ICD-10-CM

## 2020-09-09 DIAGNOSIS — I48.0 PAF (PAROXYSMAL ATRIAL FIBRILLATION): ICD-10-CM

## 2020-09-09 DIAGNOSIS — I25.118 CORONARY ARTERY DISEASE OF NATIVE ARTERY OF NATIVE HEART WITH STABLE ANGINA PECTORIS: ICD-10-CM

## 2020-09-09 PROCEDURE — 3008F PR BODY MASS INDEX (BMI) DOCUMENTED: ICD-10-PCS | Mod: CPTII,S$GLB,, | Performed by: INTERNAL MEDICINE

## 2020-09-09 PROCEDURE — 3074F SYST BP LT 130 MM HG: CPT | Mod: CPTII,S$GLB,, | Performed by: INTERNAL MEDICINE

## 2020-09-09 PROCEDURE — 99214 PR OFFICE/OUTPT VISIT, EST, LEVL IV, 30-39 MIN: ICD-10-PCS | Mod: S$GLB,,, | Performed by: INTERNAL MEDICINE

## 2020-09-09 PROCEDURE — 1125F PR PAIN SEVERITY QUANTIFIED, PAIN PRESENT: ICD-10-PCS | Mod: S$GLB,,, | Performed by: INTERNAL MEDICINE

## 2020-09-09 PROCEDURE — 1159F MED LIST DOCD IN RCRD: CPT | Mod: S$GLB,,, | Performed by: INTERNAL MEDICINE

## 2020-09-09 PROCEDURE — 3074F PR MOST RECENT SYSTOLIC BLOOD PRESSURE < 130 MM HG: ICD-10-PCS | Mod: CPTII,S$GLB,, | Performed by: INTERNAL MEDICINE

## 2020-09-09 PROCEDURE — 1125F AMNT PAIN NOTED PAIN PRSNT: CPT | Mod: S$GLB,,, | Performed by: INTERNAL MEDICINE

## 2020-09-09 PROCEDURE — 1159F PR MEDICATION LIST DOCUMENTED IN MEDICAL RECORD: ICD-10-PCS | Mod: S$GLB,,, | Performed by: INTERNAL MEDICINE

## 2020-09-09 PROCEDURE — 3008F BODY MASS INDEX DOCD: CPT | Mod: CPTII,S$GLB,, | Performed by: INTERNAL MEDICINE

## 2020-09-09 PROCEDURE — 3078F DIAST BP <80 MM HG: CPT | Mod: CPTII,S$GLB,, | Performed by: INTERNAL MEDICINE

## 2020-09-09 PROCEDURE — 3078F PR MOST RECENT DIASTOLIC BLOOD PRESSURE < 80 MM HG: ICD-10-PCS | Mod: CPTII,S$GLB,, | Performed by: INTERNAL MEDICINE

## 2020-09-09 PROCEDURE — 1101F PT FALLS ASSESS-DOCD LE1/YR: CPT | Mod: CPTII,S$GLB,, | Performed by: INTERNAL MEDICINE

## 2020-09-09 PROCEDURE — 99214 OFFICE O/P EST MOD 30 MIN: CPT | Mod: S$GLB,,, | Performed by: INTERNAL MEDICINE

## 2020-09-09 PROCEDURE — 1101F PR PT FALLS ASSESS DOC 0-1 FALLS W/OUT INJ PAST YR: ICD-10-PCS | Mod: CPTII,S$GLB,, | Performed by: INTERNAL MEDICINE

## 2020-09-09 RX ORDER — BUMETANIDE 0.5 MG/1
TABLET ORAL
Qty: 90 TABLET | Refills: 1 | Status: SHIPPED | OUTPATIENT
Start: 2020-09-09 | End: 2023-12-08

## 2020-09-09 RX ORDER — VALSARTAN 160 MG/1
160 TABLET ORAL DAILY
Qty: 90 TABLET | Refills: 1 | Status: SHIPPED | OUTPATIENT
Start: 2020-09-09 | End: 2021-12-15 | Stop reason: CLARIF

## 2020-09-09 RX ORDER — SODIUM CHLORIDE 0.9 % (FLUSH) 0.9 %
10 SYRINGE (ML) INJECTION
Status: DISCONTINUED | OUTPATIENT
Start: 2020-09-09 | End: 2020-09-29

## 2020-09-09 RX ORDER — SODIUM CHLORIDE 9 MG/ML
INJECTION, SOLUTION INTRAVENOUS ONCE
Status: CANCELLED | OUTPATIENT
Start: 2020-09-09 | End: 2020-09-09

## 2020-09-09 RX ORDER — AMLODIPINE BESYLATE 2.5 MG/1
2.5 TABLET ORAL DAILY
Qty: 90 TABLET | Refills: 0 | Status: SHIPPED | OUTPATIENT
Start: 2020-09-09 | End: 2020-12-08

## 2020-09-09 RX ORDER — ROSUVASTATIN CALCIUM 5 MG/1
5 TABLET, COATED ORAL DAILY
Qty: 90 TABLET | Refills: 1 | Status: SHIPPED | OUTPATIENT
Start: 2020-09-09 | End: 2020-12-09 | Stop reason: SDUPTHER

## 2020-09-09 NOTE — PATIENT INSTRUCTIONS
Angiogram    Arrive for procedure at: [facility] 9/17 at 10am    You will receive a phone call from [facility] Pre-Op Department with further instructions prior to your scheduled procedure.    Notify the nurse if you are ALLERGIC TO IODINE.    FASTING: You MAY NOT have anything to eat or drink AFTER MIDNIGHT the day before your procedure. If your procedure is scheduled in the afternoon, you may have a LIGHT BREAKFAST 6-8 hours prior to your procedure.  For example: Two slices of toast; black coffee or black tea.    MEDICATIONS: You may take your regular morning medications with water. If there are any medications that you should not take, you will be instructed to hold them for that morning.    ? CARDIOLOGY PRE-PROCEDURE MEDICATION ORDERS:  ** Please hold any medications that are checked below:    HOLD   # OF DAYS TO HOLD  ? Coumadin   Consult with Coumadin Clinic   ? Xarelto    _DAY BEFORE & DAY OF_  ? Pradaxa  _ DAY BEFORE & DAY OF _  ? Eliquis   _ DAY BEFORE & DAY OF _  ? Metformin    Day before procedure & morning of procedure  ? Short acting insulin   Morning of procedure    CONTINUE the Following Medications   ? Plavix      ? Effient     ? Aspirin    WHAT TO EXPECT:    How long will the procedure take?  The procedure will take an average of 1 - 2 hours to perform.  After the procedure, you will need to lay flat for around 4 - 6 hours to minimize bleeding from the puncture site. If the wrist is accessed you will need to keep your arm still as instructed by the nurse.    When can I go home?  You may be able to be discharged home that same afternoon if there were no complications.  If you have one of the following: balloon; stent; pacemaker or defibrillator procedures, you may spend one night for observation.  Your doctor will determine your discharge based upon your progress.  The results of your procedure will be discussed with you before you are discharged.  Any further testing or procedures  will be scheduled for you either before you leave or you will be instructed to call for a future appointment.      TRANSPORTATION:  PLEASE ARRANGE TO HAVE SOMEONE DRIVE YOU HOME FOLLOWING YOUR PROCEDURE, YOU WILL NOT BE ALLOWED TO DRIVE.

## 2020-09-09 NOTE — PROGRESS NOTES
Subjective:    Patient ID:  Aimee Aquino is a 70 y.o. female who presents for Atrial Fibrillation (Echo and labs), Hypertension, and Valvular Heart Disease        HPI   LABS IN January CR 1.18, LFT'S OK, ECHO SEVERE AS, REPEAT LABS , CBC OK, , CR 1.12, GFR 49, , NO LIPIDS,LAST HBA1C 5%, WORSENING NEUROPATHY AND FEET PAIN,  SEE ROS  · Concentric left ventricular hypertrophy.  · Normal left ventricular systolic function. The estimated ejection fraction is 65%.  · Indeterminate left ventricular diastolic function.  · Normal right ventricular systolic function.  · Severe left atrial enlargement.  · Moderate-to-severe aortic valve stenosis.  · Aortic valve area is 0.97 cm2; peak velocity is 3.53 m/s; mean gradient is 33 mmHg.  · Normal central venous pressure (3 mmHg).  · The estimated PA systolic pressure is 35 mmHg.       Past Medical History:   Diagnosis Date    Anemia     will be starting iron infusions 2015    Anxiety     Aortic stenosis     Arthritis     Ascites     Atrial fibrillation     NOT SURE WHEN    Back pain, chronic     has spinal stimulator    Bladder spasms     Cancer     skin CA    CHF (congestive heart failure)     CKD (chronic kidney disease), stage III     not on dialysis    Coronary artery disease     Diabetes mellitus     Fibrocystic breast     Fibromyalgia     GERD (gastroesophageal reflux disease)     Gout     Heart murmur     History of urinary urgency     Hyperlipidemia     Hypertension     controlled with diuretic, sometimes hypotension    Neuropathy     Oxygen dependent     2 lpm most of the time    Restless legs syndrome     severe    Scoliosis     Shingles     Unsure of dates    Sleep apnea     CPAP, use with oxygen    Stenosis of aortic and mitral valves     Stroke     x3    Thyroid disease     hypothyroid    Venous insufficiency of leg     wears pumps on legs, boots    Vertigo     uses Antivert 3 times every day     Past Surgical History:   Procedure  Laterality Date    APPENDECTOMY      BREAST BIOPSY Right     excisional over 20 yrs. neg liphoma    CARDIAC CATHETERIZATION  2010    no stents    CATARACT EXTRACTION W/  INTRAOCULAR LENS IMPLANT Bilateral     CHOLECYSTECTOMY      EYE SURGERY      cataracts removed    GASTRIC BYPASS  2010    HYSTERECTOMY      OOPHORECTOMY      PARTIAL HYSTERECTOMY  1980's they took one ovary only    AK EXPLORATORY OF ABDOMEN      w/ RT salpingo-oopherectomy    SHOULDER SURGERY      left shoulder    SPINAL CORD STIMULATOR IMPLANT      TONSILLECTOMY, ADENOIDECTOMY      TOTAL KNEE ARTHROPLASTY Bilateral     VASCULAR SURGERY  Sept 2014    EVLT saphenous, left leg     Family History   Problem Relation Age of Onset    Diabetes Mother     Stroke Mother     Heart disease Mother     Heart attack Father     Heart disease Father     Heart disease Sister     Cancer Maternal Aunt     Breast cancer Maternal Aunt         50's    Stroke Paternal Aunt     Diabetes Paternal Aunt     Heart disease Maternal Grandfather     Cancer Maternal Grandfather     Heart attack Paternal Grandfather     Cancer Maternal Aunt      Social History     Socioeconomic History    Marital status:      Spouse name: Not on file    Number of children: Not on file    Years of education: Not on file    Highest education level: Not on file   Occupational History    Not on file   Social Needs    Financial resource strain: Not on file    Food insecurity     Worry: Not on file     Inability: Not on file    Transportation needs     Medical: Not on file     Non-medical: Not on file   Tobacco Use    Smoking status: Former Smoker     Quit date: 1/20/2010     Years since quitting: 10.6    Smokeless tobacco: Never Used   Substance and Sexual Activity    Alcohol use: No    Drug use: No    Sexual activity: Never   Lifestyle    Physical activity     Days per week: Not on file     Minutes per session: Not on file    Stress: Not on file    Relationships    Social connections     Talks on phone: Not on file     Gets together: Not on file     Attends Mu-ism service: Not on file     Active member of club or organization: Not on file     Attends meetings of clubs or organizations: Not on file     Relationship status: Not on file   Other Topics Concern    Not on file   Social History Narrative    Not on file       Review of patient's allergies indicates:   Allergen Reactions    Iodinated contrast media Swelling     Lip and face swelling  Lip and face swelling    Penicillins Blisters    Shellfish containing products Swelling    Adhesive tape-silicones Rash and Blisters     Use paper tape    Ambien [zolpidem] Other (See Comments)     sleepwalking    Codeine Rash    Demerol [meperidine] Rash    Penicillin g Rash     abcess  abcess    Sulfa (sulfonamide antibiotics) Rash    Sulfasalazine Rash    Tetracycline Rash       Current Outpatient Medications:     ACETAMINOPHEN (TYLENOL 8 HOUR ORAL), Take 1 tablet by mouth once daily., Disp: , Rfl:     allopurinol (ZYLOPRIM) 300 MG tablet, Take 1 tablet (300 mg total) by mouth once daily., Disp: 30 tablet, Rfl: 5    amitriptyline (ELAVIL) 100 MG tablet, Takes two tablets once a day, Disp: , Rfl: 5    amitriptyline (ELAVIL) 50 MG tablet, Take 50 mg by mouth every evening., Disp: , Rfl:     amLODIPine (NORVASC) 2.5 MG tablet, Take 1 tablet (2.5 mg total) by mouth once daily., Disp: 90 tablet, Rfl: 0    ammonium lactate (LAC-HYDRIN) 12 % lotion, Apply topically once daily., Disp: , Rfl:     apixaban (ELIQUIS) 2.5 mg Tab, Take 1 tablet (2.5 mg total) by mouth 2 (two) times daily., Disp: 180 tablet, Rfl: 1    ascorbic acid, vitamin C, (VITAMIN C) 1000 MG tablet, Take 1,000 mg by mouth once daily., Disp: , Rfl:     BD ALCOHOL SWABS PadM, , Disp: , Rfl:     biotin 2,500 mcg Cap, Take 5,000 mg by mouth once daily.  , Disp: , Rfl:     bumetanide (BUMEX) 0.5 MG Tab, TAKE 1 TABLET BY MOUTH EVERY  "MORNING MAY TAKE AN EXTRA DOSE IN PM FOR WEIGHT GAIN >3 LBS-2 DAYS, Disp: 90 tablet, Rfl: 1    bumetanide (BUMEX) 1 MG tablet, TAKE 1 TABLET BY MOUTH TWICE A DAY, Disp: 60 tablet, Rfl: 0    calcitonin, salmon, (FORTICAL) 200 unit/actuation nasal spray, 1 spray by Nasal route once daily., Disp: , Rfl:     cholecalciferol, vitamin D3, 5,000 unit capsule, Take 5,000 Units by mouth once daily. , Disp: , Rfl:     cyanocobalamin (VITAMIN B-12) 500 MCG tablet, Take 500 mcg by mouth once daily., Disp: , Rfl:     estradiol (ESTRACE) 1 MG tablet, 1 mg. , Disp: , Rfl: 7    furosemide (LASIX) 40 MG tablet, Take 1 tablet (40 mg total) by mouth as needed., Disp: 30 tablet, Rfl: 5    guaiFENesin (HUMIBID E) 400 mg Tab, Take 400 mg by mouth., Disp: , Rfl:     HYDROmorphone (DILAUDID) 4 MG tablet, Take 4 mg by mouth every 4 (four) hours as needed for Pain., Disp: , Rfl:     immun glob G,IgG,/pro/IgA 0-50 (HIZENTRA SUBQ), Inject into the skin. Every 10 days, Disp: , Rfl:     insulin glargine (LANTUS) 100 unit/mL injection, Inject 35 Units into the skin once daily. , Disp: , Rfl:     insulin syringe-needle U-100 (BD INSULIN SYRINGE ULTRA-FINE) 1 mL 31 x 5/16" Syrg, USE 3 TIMES DAILY, Disp: 100 each, Rfl: 6    levothyroxine (SYNTHROID) 100 MCG tablet, TAKE 1 TABLET (100 MCG TOTAL) BY MOUTH ONCE DAILY., Disp: 30 tablet, Rfl: 5    loratadine (CLARITIN) 10 mg tablet, Take 10 mg by mouth once daily., Disp: , Rfl:     MAGNESIUM ORAL, Take 400 mg by mouth once daily., Disp: , Rfl:     mupirocin (BACTROBAN) 2 % ointment, , Disp: , Rfl:     nitrofurantoin, macrocrystal-monohydrate, (MACROBID) 100 MG capsule, , Disp: , Rfl:     NOVOLOG 100 unit/mL injection, Inject 15 Units into the skin 3 (three) times daily before meals. , Disp: , Rfl:     PATANOL 0.1 % ophthalmic solution, PLACE 1 DROP INTO BOTH EYES ONCE DAILY, Disp: 5 mL, Rfl: 3    pramipexole (MIRAPEX) 0.5 MG tablet, TAKE 3 TABLETS BY MOUTH IN P.M., Disp: 90 tablet, " Rfl: 3    pregabalin (LYRICA) 100 MG capsule, Take 1 capsule by mouth 2 (two) times daily. , Disp: , Rfl:     pyridoxine, vitamin B6, (VITAMIN B-6) 100 MG Tab, Take 50 mg by mouth once daily., Disp: , Rfl:     rosuvastatin (CRESTOR) 5 MG tablet, Take 1 tablet (5 mg total) by mouth once daily., Disp: 90 tablet, Rfl: 1    thiamine HCl (VITAMIN B-1) 500 MG tablet, Take 500 mg by mouth once daily., Disp: , Rfl:     TRUE METRIX GLUCOSE TEST STRIP Strp, by Alternating Nares route 4 (four) times daily as needed., Disp: , Rfl:     valsartan (DIOVAN) 160 MG tablet, Take 1 tablet (160 mg total) by mouth once daily., Disp: 90 tablet, Rfl: 1    fluconazole (DIFLUCAN) 100 MG tablet, TAKE 1 TABLET (100 MG TOTAL) BY MOUTH ONCE. (Patient not taking: Reported on 7/28/2020), Disp: 5 tablet, Rfl: 3    MAGNESIUM ORAL, Take 500 tablets by mouth nightly., Disp: , Rfl:     meclizine (ANTIVERT) 12.5 mg tablet, TAKE 1 TABLET (12.5 MG TOTAL) BY MOUTH 3 (THREE) TIMES DAILY AS NEEDED. (Patient taking differently: TAKE 1 TABLET (12.5 MG TOTAL) BY MOUTH 2 TIMES DAILY AS NEEDED.), Disp: 90 tablet, Rfl: 0    selenium 200 mcg Cap, Take 200 mg by mouth once daily. (Patient not taking: Reported on 7/28/2020), Disp: 30 capsule, Rfl: 5    umeclidinium-vilanterol (ANORO ELLIPTA) 62.5-25 mcg/actuation DsDv, Inhale 1 puff into the lungs once daily., Disp: , Rfl:     Current Facility-Administered Medications:     sodium chloride 0.9% flush 10 mL, 10 mL, Intravenous, PRN, Lily Guerrier MD    Review of Systems   Constitution: Negative for chills, diaphoresis, fever, weight gain and weight loss.   HENT: Negative for sore throat.    Eyes: Negative for blurred vision, vision loss in left eye, vision loss in right eye and visual disturbance.   Cardiovascular: Positive for dyspnea on exertion (MILD) and leg swelling. Negative for chest pain, claudication, cyanosis, irregular heartbeat, near-syncope, orthopnea, palpitations, paroxysmal nocturnal  "dyspnea and syncope.   Respiratory: Negative for cough, hemoptysis, shortness of breath, sputum production and wheezing.    Endocrine: Negative for cold intolerance and heat intolerance.   Hematologic/Lymphatic: Negative for adenopathy. Does not bruise/bleed easily.   Skin: Negative for rash.   Musculoskeletal: Negative for back pain, falls (HRONIC) and muscle weakness.   Gastrointestinal: Positive for bloating. Negative for abdominal pain, change in bowel habit, dysphagia, jaundice, melena and nausea.   Genitourinary: Negative for bladder incontinence.   Neurological: Positive for paresthesias (FEET). Negative for dizziness, focal weakness, headaches, light-headedness and weakness.   Psychiatric/Behavioral: Negative for altered mental status.        Objective:      Vitals:    09/09/20 1413   BP: 114/66   Pulse: 76   Weight: 103.2 kg (227 lb 8.2 oz)   Height: 5' 7" (1.702 m)   PainSc:   7     Body mass index is 35.63 kg/m².    Physical Exam   Constitutional: She is oriented to person, place, and time. She appears well-developed and well-nourished. She is active.   OBESE,    HENT:   Head: Normocephalic and atraumatic.   Mouth/Throat: Oropharynx is clear and moist and mucous membranes are normal.   Eyes: Pupils are equal, round, and reactive to light. Conjunctivae and EOM are normal.   Neck: Trachea normal and normal range of motion. Neck supple. Normal carotid pulses, no hepatojugular reflux and no JVD present. Carotid bruit is not present. No thyromegaly present.   Cardiovascular: Normal rate and regular rhythm.  No extrasystoles are present. PMI is not displaced. Exam reveals no gallop and no friction rub.   Murmur heard.   Harsh midsystolic murmur is present with a grade of 2/6 at the upper right sternal border radiating to the neck.  Pulses:       Carotid pulses are 2+ on the right side and 2+ on the left side.       Radial pulses are 2+ on the right side and 2+ on the left side.        Posterior tibial pulses " are 2+ on the right side and 2+ on the left side.   Pulmonary/Chest: Effort normal and breath sounds normal. No tachypnea and no bradypnea. She has no wheezes. She exhibits no tenderness.   Abdominal: Soft. Bowel sounds are normal. There is no hepatosplenomegaly. There is no abdominal tenderness.   Musculoskeletal: Normal range of motion.         General: No tenderness or edema.      Comments: SLOW GAIT, USES A WALKER, TRACE TO +1 EDEMA   Lymphadenopathy:     She has no cervical adenopathy.   Neurological: She is alert and oriented to person, place, and time. She has normal strength. She displays no tremor. No cranial nerve deficit.   Skin: Skin is warm and dry. No rash noted.   Psychiatric: She has a normal mood and affect. Her speech is normal and behavior is normal.               ..    Chemistry        Component Value Date/Time     09/19/2018 0508    K 3.5 09/19/2018 0508     09/19/2018 0508    CO2 32 (H) 09/19/2018 0508    BUN 45 (H) 09/19/2018 0508    CREATININE 1.16 09/19/2018 0508     (H) 09/19/2018 0508        Component Value Date/Time    CALCIUM 8.5 09/19/2018 0508    ALKPHOS 68 09/19/2018 0508    AST 23 09/19/2018 0508    ALT 44 09/19/2018 0508    BILITOT 0.2 09/19/2018 0508    ESTGFRAFRICA 55 (A) 09/19/2018 0508    EGFRNONAA 48 (A) 09/19/2018 0508            ..  Lab Results   Component Value Date    CHOL 123 08/18/2016     Lab Results   Component Value Date    HDL 33 (L) 08/18/2016     Lab Results   Component Value Date    LDLCALC 54.4 (L) 08/18/2016     Lab Results   Component Value Date    TRIG 178 (H) 08/18/2016     Lab Results   Component Value Date    CHOLHDL 26.8 08/18/2016     ..  Lab Results   Component Value Date    WBC 5.70 09/19/2018    HGB 11.6 (L) 09/19/2018    HCT 36.6 (L) 09/19/2018    MCV 97 09/19/2018     (L) 09/19/2018       Test(s) Reviewed  I have reviewed the following in detail:  [] Stress test   [] Angiography   [x] Echocardiogram   [x] Other:        Assessment:         ICD-10-CM ICD-9-CM   1. Severe aortic stenosis  I35.0 424.1   2. PAF (paroxysmal atrial fibrillation)  I48.0 427.31   3. Coronary artery disease of native artery of native heart with stable angina pectoris  I25.118 414.01     413.9   4. Tortuous aorta  I77.1 447.1   5. Thoracic aorta atherosclerosis  I70.0 440.0   6. Severe obesity (BMI 35.0-35.9 with comorbidity)  E66.01 278.01    Z68.35 V85.35   7. CKD (chronic kidney disease) stage 3, GFR 30-59 ml/min  N18.3 585.3   8. Congestive heart failure with LV diastolic dysfunction, NYHA class 2  I50.30 428.30     428.0   9. Encntr for obs for susp expsr to oth biolg agents ruled out  Z03.818 V71.83     Problem List Items Addressed This Visit        Cardiac/Vascular    Coronary artery disease involving native coronary artery of native heart without angina pectoris (Chronic)    Congestive heart failure with LV diastolic dysfunction, NYHA class 2    PAF (paroxysmal atrial fibrillation)    Severe aortic stenosis - Primary    Relevant Orders    Case Request-Cath Lab: CATHETERIZATION, HEART, BOTH LEFT AND RIGHT (Completed)    Full code    Tortuous aorta    Thoracic aorta atherosclerosis       Renal/    CKD (chronic kidney disease) stage 3, GFR 30-59 ml/min (Chronic)       Endocrine    Severe obesity (BMI 35.0-35.9 with comorbidity)      Other Visit Diagnoses     Encntr for obs for susp expsr to oth biolg agents ruled out        Relevant Orders    COVID-19 Routine Screening           Plan:         NEED FURTHER ASSESSMENT OF THE AORTIC VALVE, ANGIOGRAPHY R/LHC, DISCUSSED OPTIONS RISK AND ALTERNATIVES, HOLD ELIQUIS 2 D, MIGHT STAGE PROCEDURE OF THE INTERVENTION NEEDS TO BE DONE IN VIEW OF CHRONIC KIDNEY DISEASE,ALL OTHER CV CLINICALLY STABLE, CLASS 2 ANGINA, CLASS 1-2 HF, NO TIA, NO CLINICAL ARRHYTHMIA,CONTINUE CURRENT MEDS, EDUCATION, DIET, EXERCISE, AS MUCH AS POSSIBLE, WEIGHT LOSS, RETURN TO CLINIC IN FEW WEEKS AFTER TEST  Severe aortic stenosis  -      Case Request-Cath Lab: CATHETERIZATION, HEART, BOTH LEFT AND RIGHT; Standing  -     Full code; Standing    PAF (paroxysmal atrial fibrillation)    Coronary artery disease of native artery of native heart with stable angina pectoris  -     Case Request-Cath Lab: CATHETERIZATION, HEART, BOTH LEFT AND RIGHT; Standing  -     Full code; Standing    Tortuous aorta    Thoracic aorta atherosclerosis    Severe obesity (BMI 35.0-35.9 with comorbidity)    CKD (chronic kidney disease) stage 3, GFR 30-59 ml/min    Congestive heart failure with LV diastolic dysfunction, NYHA class 2    Encntr for obs for susp expsr to [facility] biol agents ruled out  -     COVID-19 Routine Screening; Future; Expected date: 09/09/2020    Other orders  -     sodium chloride 0.9% flush 10 mL  -     amLODIPine (NORVASC) 2.5 MG tablet; Take 1 tablet (2.5 mg total) by mouth once daily.  Dispense: 90 tablet; Refill: 0  -     bumetanide (BUMEX) 0.5 MG Tab; TAKE 1 TABLET BY MOUTH EVERY MORNING MAY TAKE AN EXTRA DOSE IN PM FOR WEIGHT GAIN >3 LBS-2 DAYS  Dispense: 90 tablet; Refill: 1  -     valsartan (DIOVAN) 160 MG tablet; Take 1 tablet (160 mg total) by mouth once daily.  Dispense: 90 tablet; Refill: 1  -     rosuvastatin (CRESTOR) 5 MG tablet; Take 1 tablet (5 mg total) by mouth once daily.  Dispense: 90 tablet; Refill: 1  -     apixaban (ELIQUIS) 2.5 mg Tab; Take 1 tablet (2.5 mg total) by mouth 2 (two) times daily.  Dispense: 180 tablet; Refill: 1    RTC Low level/low impact aerobic exercise 5x's/wk. Heart healthy diet and risk factor modification.    See labs and med orders.    Aerobic exercise 5x's/wk. Heart healthy diet and risk factor modification.    See labs and med orders.

## 2020-09-10 ENCOUNTER — TELEPHONE (OUTPATIENT)
Dept: CARDIOLOGY | Facility: CLINIC | Age: 70
End: 2020-09-10

## 2020-09-10 NOTE — TELEPHONE ENCOUNTER
Spoke to pt and advised she cannot have COVID testing done at [facility]; pt expressed understanding

## 2020-09-10 NOTE — TELEPHONE ENCOUNTER
----- Message from Estefani Kilpatrick sent at 9/10/2020  9:17 AM CDT -----  Type: Needs Medical Advice  Who Called:  pt  Symptoms (please be specific):    How long has patient had these symptoms:    Pharmacy name and phone #:    Best Call Back Number:   Additional Information: requesting a call to discuss covid testing appt 09/14. Please contact pt

## 2020-09-14 ENCOUNTER — TELEPHONE (OUTPATIENT)
Dept: CARDIOLOGY | Facility: CLINIC | Age: 70
End: 2020-09-14

## 2020-09-14 NOTE — TELEPHONE ENCOUNTER
----- Message from Payal Joshi MA sent at 9/14/2020 11:59 AM CDT -----  Type: Needs Medical Advice  Who Called:  Aimee Hurtado Call Back Number: [phone]  Additional Information: patient would like to cancel her CV 19 screening and her procedure on Thursday due to the weather.  Please call patient to confirm

## 2020-09-18 ENCOUNTER — TELEPHONE (OUTPATIENT)
Dept: CARDIOLOGY | Facility: CLINIC | Age: 70
End: 2020-09-18

## 2020-09-18 DIAGNOSIS — Z03.818 ENCOUNTER FOR OBSERVATION FOR SUSPECTED EXPOSURE TO OTHER BIOLOGICAL AGENTS RULED OUT: ICD-10-CM

## 2020-09-18 NOTE — TELEPHONE ENCOUNTER
Spoke to pt and rescheduled angiogram to 9/29 at 10 am and COVID test for 9/26; reviewed pre op instructions; pt expressed understanding

## 2020-09-18 NOTE — TELEPHONE ENCOUNTER
----- Message from Lindsey Quinn sent at 9/18/2020 10:15 AM CDT -----  Regarding: Reschedule Pt  Contact: pt  Type: Needs Medical Advice  Who Called:  Pt    Best Call Back Number: [phone]  Additional Information: Pt is calling to Reschedule surgery .Please call pt back to advise.

## 2020-09-26 ENCOUNTER — LAB VISIT (OUTPATIENT)
Dept: FAMILY MEDICINE | Facility: CLINIC | Age: 70
End: 2020-09-26
Payer: MEDICARE

## 2020-09-26 DIAGNOSIS — Z03.818 ENCOUNTER FOR OBSERVATION FOR SUSPECTED EXPOSURE TO OTHER BIOLOGICAL AGENTS RULED OUT: ICD-10-CM

## 2020-09-26 PROCEDURE — U0003 INFECTIOUS AGENT DETECTION BY NUCLEIC ACID (DNA OR RNA); SEVERE ACUTE RESPIRATORY SYNDROME CORONAVIRUS 2 (SARS-COV-2) (CORONAVIRUS DISEASE [COVID-19]), AMPLIFIED PROBE TECHNIQUE, MAKING USE OF HIGH THROUGHPUT TECHNOLOGIES AS DESCRIBED BY CMS-2020-01-R: HCPCS

## 2020-09-27 LAB — SARS-COV-2 RNA RESP QL NAA+PROBE: NOT DETECTED

## 2020-12-09 ENCOUNTER — OFFICE VISIT (OUTPATIENT)
Dept: CARDIOLOGY | Facility: CLINIC | Age: 70
End: 2020-12-09
Payer: MEDICARE

## 2020-12-09 VITALS
WEIGHT: 229 LBS | BODY MASS INDEX: 35.94 KG/M2 | DIASTOLIC BLOOD PRESSURE: 56 MMHG | SYSTOLIC BLOOD PRESSURE: 134 MMHG | HEIGHT: 67 IN

## 2020-12-09 DIAGNOSIS — I35.0 MODERATE AORTIC STENOSIS: ICD-10-CM

## 2020-12-09 DIAGNOSIS — Z79.01 LONG TERM (CURRENT) USE OF ANTICOAGULANTS: ICD-10-CM

## 2020-12-09 DIAGNOSIS — I10 ESSENTIAL HYPERTENSION: ICD-10-CM

## 2020-12-09 DIAGNOSIS — I50.30 CONGESTIVE HEART FAILURE WITH LV DIASTOLIC DYSFUNCTION, NYHA CLASS 2: Primary | ICD-10-CM

## 2020-12-09 DIAGNOSIS — I48.0 PAF (PAROXYSMAL ATRIAL FIBRILLATION): ICD-10-CM

## 2020-12-09 DIAGNOSIS — I25.10 MILD CAD: ICD-10-CM

## 2020-12-09 DIAGNOSIS — I70.0 THORACIC AORTA ATHEROSCLEROSIS: ICD-10-CM

## 2020-12-09 DIAGNOSIS — E66.01 SEVERE OBESITY (BMI 35.0-35.9 WITH COMORBIDITY): ICD-10-CM

## 2020-12-09 DIAGNOSIS — N18.31 STAGE 3A CHRONIC KIDNEY DISEASE: Chronic | ICD-10-CM

## 2020-12-09 DIAGNOSIS — E78.00 PURE HYPERCHOLESTEROLEMIA: ICD-10-CM

## 2020-12-09 PROCEDURE — 1159F PR MEDICATION LIST DOCUMENTED IN MEDICAL RECORD: ICD-10-PCS | Mod: S$GLB,,, | Performed by: INTERNAL MEDICINE

## 2020-12-09 PROCEDURE — 3288F PR FALLS RISK ASSESSMENT DOCUMENTED: ICD-10-PCS | Mod: CPTII,S$GLB,, | Performed by: INTERNAL MEDICINE

## 2020-12-09 PROCEDURE — 3008F BODY MASS INDEX DOCD: CPT | Mod: CPTII,S$GLB,, | Performed by: INTERNAL MEDICINE

## 2020-12-09 PROCEDURE — 3078F PR MOST RECENT DIASTOLIC BLOOD PRESSURE < 80 MM HG: ICD-10-PCS | Mod: CPTII,S$GLB,, | Performed by: INTERNAL MEDICINE

## 2020-12-09 PROCEDURE — 3288F FALL RISK ASSESSMENT DOCD: CPT | Mod: CPTII,S$GLB,, | Performed by: INTERNAL MEDICINE

## 2020-12-09 PROCEDURE — 3078F DIAST BP <80 MM HG: CPT | Mod: CPTII,S$GLB,, | Performed by: INTERNAL MEDICINE

## 2020-12-09 PROCEDURE — 1126F PR PAIN SEVERITY QUANTIFIED, NO PAIN PRESENT: ICD-10-PCS | Mod: S$GLB,,, | Performed by: INTERNAL MEDICINE

## 2020-12-09 PROCEDURE — 1101F PR PT FALLS ASSESS DOC 0-1 FALLS W/OUT INJ PAST YR: ICD-10-PCS | Mod: CPTII,S$GLB,, | Performed by: INTERNAL MEDICINE

## 2020-12-09 PROCEDURE — 1126F AMNT PAIN NOTED NONE PRSNT: CPT | Mod: S$GLB,,, | Performed by: INTERNAL MEDICINE

## 2020-12-09 PROCEDURE — 99214 PR OFFICE/OUTPT VISIT, EST, LEVL IV, 30-39 MIN: ICD-10-PCS | Mod: S$GLB,,, | Performed by: INTERNAL MEDICINE

## 2020-12-09 PROCEDURE — 3075F SYST BP GE 130 - 139MM HG: CPT | Mod: CPTII,S$GLB,, | Performed by: INTERNAL MEDICINE

## 2020-12-09 PROCEDURE — 3075F PR MOST RECENT SYSTOLIC BLOOD PRESS GE 130-139MM HG: ICD-10-PCS | Mod: CPTII,S$GLB,, | Performed by: INTERNAL MEDICINE

## 2020-12-09 PROCEDURE — 1159F MED LIST DOCD IN RCRD: CPT | Mod: S$GLB,,, | Performed by: INTERNAL MEDICINE

## 2020-12-09 PROCEDURE — 1101F PT FALLS ASSESS-DOCD LE1/YR: CPT | Mod: CPTII,S$GLB,, | Performed by: INTERNAL MEDICINE

## 2020-12-09 PROCEDURE — 3008F PR BODY MASS INDEX (BMI) DOCUMENTED: ICD-10-PCS | Mod: CPTII,S$GLB,, | Performed by: INTERNAL MEDICINE

## 2020-12-09 PROCEDURE — 99214 OFFICE O/P EST MOD 30 MIN: CPT | Mod: S$GLB,,, | Performed by: INTERNAL MEDICINE

## 2020-12-09 RX ORDER — ROSUVASTATIN CALCIUM 5 MG/1
5 TABLET, COATED ORAL DAILY
Qty: 90 TABLET | Refills: 1 | Status: SHIPPED | OUTPATIENT
Start: 2020-12-09 | End: 2021-05-06

## 2020-12-09 NOTE — PROGRESS NOTES
Subjective:    Patient ID:  Aimee Aquino is a 70 y.o. female who presents for Cath follow up, Congestive Heart Failure, Valvular Heart Disease, and Hypertension        HPI    DISCUSSED ANGIOGRAM AND LABS, CR 1.11, GFR 50, S/P CATH, KRISTY 1.15, MILD CAD, DOING OK, MAIN PROBLEM WITH FEET , NEUROPATHY, LESS SOB, SEE ROS  Past Medical History:   Diagnosis Date    Anemia     will be starting iron infusions 2015    Anxiety     Aortic stenosis     Arthritis     Ascites     Atrial fibrillation     NOT SURE WHEN    Back pain, chronic     has spinal stimulator    Bladder spasms     Cancer     skin CA    CHF (congestive heart failure)     CKD (chronic kidney disease), stage III     not on dialysis    Coronary artery disease     Diabetes mellitus     Fibrocystic breast     Fibromyalgia     GERD (gastroesophageal reflux disease)     Gout     Heart murmur     History of urinary urgency     Hyperlipidemia     Hypertension     controlled with diuretic, sometimes hypotension    Neuropathy     Oxygen dependent     2 lpm most of the time    Restless legs syndrome     severe    Scoliosis     Shingles     Unsure of dates    Sleep apnea     CPAP, use with oxygen    Stenosis of aortic and mitral valves     Stroke     x3    Thyroid disease     hypothyroid    Venous insufficiency of leg     wears pumps on legs, boots    Vertigo     uses Antivert 3 times every day     Past Surgical History:   Procedure Laterality Date    APPENDECTOMY      ARTERIOGRAPHY OF AORTIC ROOT  9/29/2020    Procedure: ARTERIOGRAM, AORTIC ROOT;  Surgeon: Lily Guerrier MD;  Location: [facility] CATH;  Service: Cardiology;;    BREAST BIOPSY Right     excisional over 20 yrs. neg [facility]    CARDIAC CATHETERIZATION  2010    no stents    CATARACT EXTRACTION W/  INTRAOCULAR LENS IMPLANT Bilateral     CATHETERIZATION OF BOTH LEFT AND RIGHT HEART  9/29/2020    Procedure: CATHETERIZATION, HEART, BOTH LEFT AND RIGHT;  Surgeon: Lily Guerrier MD;   Location: [facility] CATH;  Service: Cardiology;;    CHOLECYSTECTOMY      CORONARY ANGIOGRAPHY  9/29/2020    Procedure: ANGIOGRAM, CORONARY ARTERY;  Surgeon: Lily Guerrier MD;  Location: [facility] CATH;  Service: Cardiology;;    EYE SURGERY      cataracts removed    GASTRIC BYPASS  2010    HYSTERECTOMY      OOPHORECTOMY      PARTIAL HYSTERECTOMY  1980's they took one ovary only    FL EXPLORATORY OF ABDOMEN      w/ RT salpingo-oopherectomy    SHOULDER SURGERY      left shoulder    SPINAL CORD STIMULATOR IMPLANT      TONSILLECTOMY, ADENOIDECTOMY      TOTAL KNEE ARTHROPLASTY Bilateral     VASCULAR SURGERY  Sept 2014    EVLT saphenous, left leg     Family History   Problem Relation Age of Onset    Diabetes Mother     Stroke Mother     Heart disease Mother     Heart attack Father     Heart disease Father     Heart disease Sister     Cancer Maternal Aunt     Breast cancer Maternal Aunt         50's    Stroke Paternal Aunt     Diabetes Paternal Aunt     Heart disease Maternal Grandfather     Cancer Maternal Grandfather     Heart attack Paternal Grandfather     Cancer Maternal Aunt      Social History     Socioeconomic History    Marital status:      Spouse name: Not on file    Number of children: Not on file    Years of education: Not on file    Highest education level: Not on file   Occupational History    Not on file   Social Needs    Financial resource strain: Not on file    Food insecurity     Worry: Not on file     Inability: Not on file    Transportation needs     Medical: Not on file     Non-medical: Not on file   Tobacco Use    Smoking status: Former Smoker     Quit date: 1/20/2010     Years since quitting: 10.8    Smokeless tobacco: Never Used   Substance and Sexual Activity    Alcohol use: No    Drug use: No    Sexual activity: Never   Lifestyle    Physical activity     Days per week: Not on file     Minutes per session: Not on file    Stress: Not on file   Relationships     Social connections     Talks on phone: Not on file     Gets together: Not on file     Attends Bahai service: Not on file     Active member of club or organization: Not on file     Attends meetings of clubs or organizations: Not on file     Relationship status: Not on file   Other Topics Concern    Not on file   Social History Narrative    Not on file       Review of patient's allergies indicates:   Allergen Reactions    Iodinated contrast media Swelling     Lip and face swelling  Lip and face swelling    Penicillins Blisters    Shellfish containing products Swelling    Adhesive tape-silicones Rash and Blisters     Use paper tape    Ambien [zolpidem] Other (See Comments)     sleepwalking    Codeine Rash    Demerol [meperidine] Rash    Penicillin g Rash     abcess  abcess    Sulfa (sulfonamide antibiotics) Rash    Sulfasalazine Rash    Tetracycline Rash       Current Outpatient Medications:     ACETAMINOPHEN (TYLENOL 8 HOUR ORAL), Take 1 tablet by mouth 2 (two) times daily. , Disp: , Rfl:     allopurinol (ZYLOPRIM) 300 MG tablet, Take 1 tablet (300 mg total) by mouth once daily., Disp: 30 tablet, Rfl: 5    amitriptyline (ELAVIL) 50 MG tablet, Take 50 mg by mouth every morning. , Disp: , Rfl:     amLODIPine (NORVASC) 2.5 MG tablet, TAKE 1 TABLET EVERY DAY, Disp: 90 tablet, Rfl: 0    ammonium lactate (LAC-HYDRIN) 12 % lotion, Apply topically once daily., Disp: , Rfl:     apixaban (ELIQUIS) 2.5 mg Tab, Take 1 tablet (2.5 mg total) by mouth 2 (two) times daily., Disp: 180 tablet, Rfl: 1    ascorbic acid, vitamin C, (VITAMIN C) 1000 MG tablet, Take 1,000 mg by mouth once daily., Disp: , Rfl:     BD ALCOHOL SWABS PadM, , Disp: , Rfl:     biotin 2,500 mcg Cap, Take 5,000 mg by mouth once daily.  , Disp: , Rfl:     bumetanide (BUMEX) 0.5 MG Tab, TAKE 1 TABLET BY MOUTH EVERY MORNING MAY TAKE AN EXTRA DOSE IN PM FOR WEIGHT GAIN >3 LBS-2 DAYS, Disp: 90 tablet, Rfl: 1    bumetanide (BUMEX) 1 MG  "tablet, TAKE 1 TABLET BY MOUTH TWICE A DAY, Disp: 60 tablet, Rfl: 0    calcitonin, salmon, (FORTICAL) 200 unit/actuation nasal spray, 1 spray by Nasal route once daily., Disp: , Rfl:     cyanocobalamin (VITAMIN B-12) 500 MCG tablet, Take 500 mcg by mouth once daily., Disp: , Rfl:     ergocalciferol (ERGOCALCIFEROL) 50,000 unit Cap, Take 50,000 Units by mouth every 7 days. Mondays, Disp: , Rfl:     estradiol (ESTRACE) 1 MG tablet, Take 1 mg by mouth 3 (three) times a week. , Disp: , Rfl: 7    fluconazole (DIFLUCAN) 100 MG tablet, TAKE 1 TABLET (100 MG TOTAL) BY MOUTH ONCE., Disp: 5 tablet, Rfl: 3    furosemide (LASIX) 40 MG tablet, Take 1 tablet (40 mg total) by mouth as needed. (Patient taking differently: Take 40 mg by mouth Every 3 (three) days. ), Disp: 30 tablet, Rfl: 5    guaiFENesin (HUMIBID E) 400 mg Tab, Take 400 mg by mouth., Disp: , Rfl:     HYDROmorphone (DILAUDID) 4 MG tablet, Take 4 mg by mouth every 4 (four) hours as needed for Pain., Disp: , Rfl:     immun glob G,IgG,/pro/IgA 0-50 (HIZENTRA SUBQ), Inject into the skin. Every 10 days, Disp: , Rfl:     insulin glargine (LANTUS) 100 unit/mL injection, Inject 35 Units into the skin every evening. , Disp: , Rfl:     insulin syringe-needle U-100 (BD INSULIN SYRINGE ULTRA-FINE) 1 mL 31 x 5/16" Syrg, USE 3 TIMES DAILY, Disp: 100 each, Rfl: 6    levothyroxine (SYNTHROID) 100 MCG tablet, TAKE 1 TABLET (100 MCG TOTAL) BY MOUTH ONCE DAILY., Disp: 30 tablet, Rfl: 5    loratadine (CLARITIN) 10 mg tablet, Take 10 mg by mouth once daily., Disp: , Rfl:     MAGNESIUM ORAL, Take 500 tablets by mouth nightly. Magnesium tarate, Disp: , Rfl:     meclizine (ANTIVERT) 12.5 mg tablet, TAKE 1 TABLET (12.5 MG TOTAL) BY MOUTH 3 (THREE) TIMES DAILY AS NEEDED. (Patient taking differently: Take 12.5 mg by mouth 2 (two) times daily. ), Disp: 90 tablet, Rfl: 0    mupirocin (BACTROBAN) 2 % ointment, 1 g by Nasal route daily as needed. , Disp: , Rfl:     NOVOLOG 100 " unit/mL injection, Inject 15 Units into the skin 3 (three) times daily before meals. , Disp: , Rfl:     PATANOL 0.1 % ophthalmic solution, PLACE 1 DROP INTO BOTH EYES ONCE DAILY, Disp: 5 mL, Rfl: 3    pramipexole (MIRAPEX) 0.5 MG tablet, TAKE 3 TABLETS BY MOUTH IN P.M. (Patient taking differently: Take 0.5 mg by mouth 3 (three) times daily. ), Disp: 90 tablet, Rfl: 3    pregabalin (LYRICA) 100 MG capsule, Take 100 mg by mouth 2 (two) times daily. , Disp: , Rfl:     pyridoxine, vitamin B6, (VITAMIN B-6) 100 MG Tab, Take 50 mg by mouth once daily., Disp: , Rfl:     rosuvastatin (CRESTOR) 5 MG tablet, Take 1 tablet (5 mg total) by mouth once daily., Disp: 90 tablet, Rfl: 1    selenium 200 mcg Cap, Take 200 mg by mouth once daily., Disp: 30 capsule, Rfl: 5    thiamine HCl (VITAMIN B-1) 500 MG tablet, Take 500 mg by mouth once daily., Disp: , Rfl:     TRUE METRIX GLUCOSE TEST STRIP Strp, by Alternating Nares route 4 (four) times daily as needed., Disp: , Rfl:     valsartan (DIOVAN) 160 MG tablet, Take 1 tablet (160 mg total) by mouth once daily. (Patient taking differently: Take 160 mg by mouth every evening. ), Disp: 90 tablet, Rfl: 1    Review of Systems   Constitution: Negative for chills, diaphoresis, fever and weight gain.   HENT: Negative for congestion and sore throat.    Eyes: Negative for blurred vision and visual disturbance.   Cardiovascular: Positive for leg swelling. Negative for chest pain, claudication, cyanosis, dyspnea on exertion (MILD), irregular heartbeat, near-syncope, orthopnea, palpitations, paroxysmal nocturnal dyspnea and syncope.   Respiratory: Negative for cough, hemoptysis, shortness of breath and wheezing.    Endocrine: Negative for polydipsia and polyuria.   Hematologic/Lymphatic: Negative for adenopathy. Does not bruise/bleed easily.   Skin: Negative for itching and rash.   Musculoskeletal: Negative for falls and muscle weakness.   Gastrointestinal: Negative for abdominal pain,  "dysphagia, jaundice, melena and nausea.   Genitourinary: Negative for dysuria and flank pain.   Neurological: Positive for numbness (FEET). Negative for dizziness, focal weakness, headaches, light-headedness and weakness.   Psychiatric/Behavioral: Negative for altered mental status and memory loss.   Allergic/Immunologic: Negative for persistent infections.        Objective:      Vitals:    12/09/20 1108   BP: (!) 134/56   Weight: 103.9 kg (229 lb)   Height: 5' 7" (1.702 m)   PainSc: 0-No pain     Body mass index is 35.87 kg/m².    Physical Exam   Constitutional: She is oriented to person, place, and time. She appears well-developed and well-nourished. She is active.   OBESE,    HENT:   Head: Normocephalic and atraumatic.   Eyes: Pupils are equal, round, and reactive to light. Conjunctivae and EOM are normal.   Neck: Neck supple. Normal carotid pulses (SLIGHTLY), no hepatojugular reflux and no JVD present.   Cardiovascular: Normal rate and regular rhythm.  No extrasystoles are present. Exam reveals no gallop and no friction rub.   Murmur heard.   Harsh midsystolic murmur is present with a grade of 2/6 at the upper right sternal border radiating to the neck.  Pulses:       Carotid pulses are 2+ on the right side and 2+ on the left side.       Radial pulses are 2+ on the right side and 2+ on the left side.        Posterior tibial pulses are 2+ on the right side and 2+ on the left side.   Pulmonary/Chest: Effort normal and breath sounds normal. She has no decreased breath sounds. She has no wheezes. She has no rhonchi. She has no rales. She exhibits no tenderness.   Abdominal: Soft. There is no abdominal tenderness. There is no CVA tenderness.   Musculoskeletal: Normal range of motion.         General: No tenderness or edema.      Comments: SLOW GAIT, USES A WALKER, TRACE TO +1 EDEMA   Neurological: She is alert and oriented to person, place, and time. No cranial nerve deficit.   Skin: Skin is warm and dry. No rash " noted.   Psychiatric: She has a normal mood and affect. Her speech is normal and behavior is normal.               ..    Chemistry        Component Value Date/Time     09/29/2020 0845    K 3.8 09/29/2020 0845     09/29/2020 0845    CO2 33 (H) 09/29/2020 0845    BUN 37 (H) 09/29/2020 0845    CREATININE 1.11 09/29/2020 0845    GLU 97 09/29/2020 0845        Component Value Date/Time    CALCIUM 8.9 09/29/2020 0845    ALKPHOS 68 09/19/2018 0508    AST 23 09/19/2018 0508    ALT 44 09/19/2018 0508    BILITOT 0.2 09/19/2018 0508    ESTGFRAFRICA 57 (A) 09/29/2020 0845    EGFRNONAA 50 (A) 09/29/2020 0845            ..  Lab Results   Component Value Date    CHOL 123 08/18/2016     Lab Results   Component Value Date    HDL 33 (L) 08/18/2016     Lab Results   Component Value Date    LDLCALC 54.4 (L) 08/18/2016     Lab Results   Component Value Date    TRIG 178 (H) 08/18/2016     Lab Results   Component Value Date    CHOLHDL 26.8 08/18/2016     ..  Lab Results   Component Value Date    WBC 6.35 09/29/2020    HGB 11.4 (L) 09/29/2020    HCT 36.1 (L) 09/29/2020     (H) 09/29/2020     (L) 09/29/2020       Test(s) Reviewed  I have reviewed the following in detail:  [] Stress test   [x] Angiography   [] Echocardiogram   [x] Labs   [] Other:       Assessment:         ICD-10-CM ICD-9-CM   1. Congestive heart failure with LV diastolic dysfunction, NYHA class 2  I50.30 428.30     428.0   2. PAF (paroxysmal atrial fibrillation)  I48.0 427.31   3. Moderate aortic stenosis  I35.0 424.1   4. Thoracic aorta atherosclerosis  I70.0 440.0   5. Stage 3a chronic kidney disease  N18.31 585.3   6. Severe obesity (BMI 35.0-35.9 with comorbidity)  E66.01 278.01    Z68.35 V85.35   7. Essential hypertension  I10 401.9   8. Mild CAD  I25.10 414.00   9. Pure hypercholesterolemia  E78.00 272.0   10. Long term (current) use of anticoagulants  Z79.01 V58.61     Problem List Items Addressed This Visit        Cardiac/Vascular    Mild  CAD    Relevant Orders    Comprehensive Metabolic Panel    Lipid Panel    Essential hypertension    Relevant Orders    Comprehensive Metabolic Panel    Congestive heart failure with LV diastolic dysfunction, NYHA class 2 - Primary    Relevant Orders    Comprehensive Metabolic Panel    Pure hypercholesterolemia    Relevant Orders    Comprehensive Metabolic Panel    Lipid Panel    PAF (paroxysmal atrial fibrillation)    Relevant Orders    Comprehensive Metabolic Panel    Moderate aortic stenosis    Relevant Orders    Comprehensive Metabolic Panel    Thoracic aorta atherosclerosis    Relevant Orders    Comprehensive Metabolic Panel       Renal/    CKD (chronic kidney disease) stage 3, GFR 30-59 ml/min (Chronic)    Relevant Orders    Comprehensive Metabolic Panel       Hematology    Long term (current) use of anticoagulants    Relevant Orders    Hemoglobin       Endocrine    Severe obesity (BMI 35.0-35.9 with comorbidity)    Relevant Orders    Comprehensive Metabolic Panel           Plan:     ALL CV CLINICALLY STABLE, NO ANGINA, CLASS 2 HF, NO TIA, STABLE CLINICAL ARRHYTHMIA,CONTINUE CURRENT MEDS, EDUCATION, DIET, EXERCISE AS MUCH AS POSSIBLE, WEIGHT LOSS, WILL MONITOR AORTIC VALVE NO INDICATION FOR AORTIC VALVE REPLACEMENT OR TAVR AT THIS POINT, RETURN TO CLINIC IN 6 MO WITH LABS, INCREASED CV RISK WITH CKD      Congestive heart failure with LV diastolic dysfunction, NYHA class 2  -     Comprehensive Metabolic Panel; Future; Expected date: 06/09/2021    PAF (paroxysmal atrial fibrillation)  -     Comprehensive Metabolic Panel; Future; Expected date: 06/09/2021    Moderate aortic stenosis  -     Comprehensive Metabolic Panel; Future; Expected date: 06/09/2021    Thoracic aorta atherosclerosis  -     Comprehensive Metabolic Panel; Future; Expected date: 06/09/2021    Stage 3a chronic kidney disease  -     Comprehensive Metabolic Panel; Future; Expected date: 06/09/2021    Severe obesity (BMI 35.0-35.9 with  comorbidity)  -     Comprehensive Metabolic Panel; Future; Expected date: 06/09/2021    Essential hypertension  -     Comprehensive Metabolic Panel; Future; Expected date: 06/09/2021    Mild CAD  -     Comprehensive Metabolic Panel; Future; Expected date: 06/09/2021  -     Lipid Panel; Future; Expected date: 06/09/2021    Pure hypercholesterolemia  -     Comprehensive Metabolic Panel; Future; Expected date: 06/09/2021  -     Lipid Panel; Future; Expected date: 06/09/2021    Long term (current) use of anticoagulants  -     Hemoglobin; Future; Expected date: 06/09/2021    Other orders  -     rosuvastatin (CRESTOR) 5 MG tablet; Take 1 tablet (5 mg total) by mouth once daily.  Dispense: 90 tablet; Refill: 1    RTC Low level/low impact aerobic exercise 5x's/wk. Heart healthy diet and risk factor modification.    See labs and med orders.    Aerobic exercise 5x's/wk. Heart healthy diet and risk factor modification.    See labs and med orders.

## 2021-01-08 ENCOUNTER — IMMUNIZATION (OUTPATIENT)
Dept: FAMILY MEDICINE | Facility: CLINIC | Age: 71
End: 2021-01-08
Payer: MEDICARE

## 2021-01-08 ENCOUNTER — PATIENT MESSAGE (OUTPATIENT)
Dept: FAMILY MEDICINE | Facility: CLINIC | Age: 71
End: 2021-01-08

## 2021-01-08 DIAGNOSIS — Z23 NEED FOR VACCINATION: ICD-10-CM

## 2021-01-08 PROCEDURE — 91300 COVID-19, MRNA, LNP-S, PF, 30 MCG/0.3 ML DOSE VACCINE: CPT | Mod: PBBFAC | Performed by: INTERNAL MEDICINE

## 2021-01-29 ENCOUNTER — IMMUNIZATION (OUTPATIENT)
Dept: FAMILY MEDICINE | Facility: CLINIC | Age: 71
End: 2021-01-29
Payer: MEDICARE

## 2021-01-29 DIAGNOSIS — Z23 NEED FOR VACCINATION: Primary | ICD-10-CM

## 2021-01-29 PROCEDURE — 91300 COVID-19, MRNA, LNP-S, PF, 30 MCG/0.3 ML DOSE VACCINE: CPT | Mod: PBBFAC | Performed by: FAMILY MEDICINE

## 2021-01-29 PROCEDURE — 0002A COVID-19, MRNA, LNP-S, PF, 30 MCG/0.3 ML DOSE VACCINE: CPT | Mod: PBBFAC | Performed by: FAMILY MEDICINE

## 2021-04-25 DIAGNOSIS — I50.30 CONGESTIVE HEART FAILURE WITH LV DIASTOLIC DYSFUNCTION, NYHA CLASS 2: ICD-10-CM

## 2021-04-25 RX ORDER — BUMETANIDE 1 MG/1
TABLET ORAL
Qty: 60 TABLET | Refills: 0 | Status: SHIPPED | OUTPATIENT
Start: 2021-04-25 | End: 2021-05-21

## 2021-06-04 ENCOUNTER — LAB VISIT (OUTPATIENT)
Dept: FAMILY MEDICINE | Facility: CLINIC | Age: 71
End: 2021-06-04
Payer: MEDICARE

## 2021-06-04 DIAGNOSIS — N18.31 STAGE 3A CHRONIC KIDNEY DISEASE: Chronic | ICD-10-CM

## 2021-06-04 DIAGNOSIS — I25.10 MILD CAD: ICD-10-CM

## 2021-06-04 DIAGNOSIS — I10 ESSENTIAL HYPERTENSION: ICD-10-CM

## 2021-06-04 DIAGNOSIS — E66.01 SEVERE OBESITY (BMI 35.0-35.9 WITH COMORBIDITY): ICD-10-CM

## 2021-06-04 DIAGNOSIS — I70.0 THORACIC AORTA ATHEROSCLEROSIS: ICD-10-CM

## 2021-06-04 DIAGNOSIS — I35.0 MODERATE AORTIC STENOSIS: ICD-10-CM

## 2021-06-04 DIAGNOSIS — E78.00 PURE HYPERCHOLESTEROLEMIA: ICD-10-CM

## 2021-06-04 DIAGNOSIS — Z79.01 LONG TERM (CURRENT) USE OF ANTICOAGULANTS: ICD-10-CM

## 2021-06-04 DIAGNOSIS — I50.30 CONGESTIVE HEART FAILURE WITH LV DIASTOLIC DYSFUNCTION, NYHA CLASS 2: ICD-10-CM

## 2021-06-04 DIAGNOSIS — I48.0 PAF (PAROXYSMAL ATRIAL FIBRILLATION): ICD-10-CM

## 2021-06-04 PROCEDURE — 36415 COLL VENOUS BLD VENIPUNCTURE: CPT | Performed by: INTERNAL MEDICINE

## 2021-06-04 PROCEDURE — 85018 HEMOGLOBIN: CPT | Performed by: INTERNAL MEDICINE

## 2021-06-04 PROCEDURE — 80053 COMPREHEN METABOLIC PANEL: CPT | Performed by: INTERNAL MEDICINE

## 2021-06-04 PROCEDURE — 80061 LIPID PANEL: CPT | Performed by: INTERNAL MEDICINE

## 2021-06-04 PROCEDURE — 36415 COLL VENOUS BLD VENIPUNCTURE: CPT | Mod: S$GLB,,, | Performed by: INTERNAL MEDICINE

## 2021-06-04 PROCEDURE — 36415 PR COLLECTION VENOUS BLOOD,VENIPUNCTURE: ICD-10-PCS | Mod: S$GLB,,, | Performed by: INTERNAL MEDICINE

## 2021-06-05 LAB
ALBUMIN SERPL BCP-MCNC: 3.4 G/DL (ref 3.5–5.2)
ALP SERPL-CCNC: 63 U/L (ref 55–135)
ALT SERPL W/O P-5'-P-CCNC: 27 U/L (ref 10–44)
ANION GAP SERPL CALC-SCNC: 9 MMOL/L (ref 8–16)
AST SERPL-CCNC: 23 U/L (ref 10–40)
BILIRUB SERPL-MCNC: 0.3 MG/DL (ref 0.1–1)
BUN SERPL-MCNC: 28 MG/DL (ref 8–23)
CALCIUM SERPL-MCNC: 9.5 MG/DL (ref 8.7–10.5)
CHLORIDE SERPL-SCNC: 103 MMOL/L (ref 95–110)
CHOLEST SERPL-MCNC: 112 MG/DL (ref 120–199)
CHOLEST/HDLC SERPL: 2.6 {RATIO} (ref 2–5)
CO2 SERPL-SCNC: 29 MMOL/L (ref 23–29)
CREAT SERPL-MCNC: 1.1 MG/DL (ref 0.5–1.4)
EST. GFR  (AFRICAN AMERICAN): 57.6 ML/MIN/1.73 M^2
EST. GFR  (NON AFRICAN AMERICAN): 49.9 ML/MIN/1.73 M^2
GLUCOSE SERPL-MCNC: 169 MG/DL (ref 70–110)
HDLC SERPL-MCNC: 43 MG/DL (ref 40–75)
HDLC SERPL: 38.4 % (ref 20–50)
HGB BLD-MCNC: 12 G/DL (ref 12–16)
LDLC SERPL CALC-MCNC: 39.6 MG/DL (ref 63–159)
NONHDLC SERPL-MCNC: 69 MG/DL
POTASSIUM SERPL-SCNC: 4.2 MMOL/L (ref 3.5–5.1)
PROT SERPL-MCNC: 7 G/DL (ref 6–8.4)
SODIUM SERPL-SCNC: 141 MMOL/L (ref 136–145)
TRIGL SERPL-MCNC: 147 MG/DL (ref 30–150)

## 2021-06-23 ENCOUNTER — OFFICE VISIT (OUTPATIENT)
Dept: CARDIOLOGY | Facility: CLINIC | Age: 71
End: 2021-06-23
Payer: MEDICARE

## 2021-06-23 VITALS
HEIGHT: 67 IN | SYSTOLIC BLOOD PRESSURE: 136 MMHG | WEIGHT: 235 LBS | OXYGEN SATURATION: 97 % | HEART RATE: 67 BPM | BODY MASS INDEX: 36.88 KG/M2 | DIASTOLIC BLOOD PRESSURE: 70 MMHG

## 2021-06-23 DIAGNOSIS — N18.31 STAGE 3A CHRONIC KIDNEY DISEASE: Chronic | ICD-10-CM

## 2021-06-23 DIAGNOSIS — I50.30 CONGESTIVE HEART FAILURE WITH LV DIASTOLIC DYSFUNCTION, NYHA CLASS 2: Primary | Chronic | ICD-10-CM

## 2021-06-23 DIAGNOSIS — I77.1 TORTUOUS AORTA: ICD-10-CM

## 2021-06-23 DIAGNOSIS — E66.01 SEVERE OBESITY (BMI 35.0-35.9 WITH COMORBIDITY): Chronic | ICD-10-CM

## 2021-06-23 DIAGNOSIS — I70.0 THORACIC AORTA ATHEROSCLEROSIS: Chronic | ICD-10-CM

## 2021-06-23 DIAGNOSIS — I48.0 PAF (PAROXYSMAL ATRIAL FIBRILLATION): Chronic | ICD-10-CM

## 2021-06-23 DIAGNOSIS — Z01.810 PRE-OPERATIVE CARDIOVASCULAR EXAMINATION, SYMPTOMATIC CHF: ICD-10-CM

## 2021-06-23 DIAGNOSIS — I35.0 MODERATE AORTIC STENOSIS: ICD-10-CM

## 2021-06-23 DIAGNOSIS — I50.9 PRE-OPERATIVE CARDIOVASCULAR EXAMINATION, SYMPTOMATIC CHF: ICD-10-CM

## 2021-06-23 DIAGNOSIS — I10 ESSENTIAL HYPERTENSION: Chronic | ICD-10-CM

## 2021-06-23 PROCEDURE — 1159F PR MEDICATION LIST DOCUMENTED IN MEDICAL RECORD: ICD-10-PCS | Mod: S$GLB,,, | Performed by: INTERNAL MEDICINE

## 2021-06-23 PROCEDURE — 3078F DIAST BP <80 MM HG: CPT | Mod: CPTII,S$GLB,, | Performed by: INTERNAL MEDICINE

## 2021-06-23 PROCEDURE — 99214 PR OFFICE/OUTPT VISIT, EST, LEVL IV, 30-39 MIN: ICD-10-PCS | Mod: S$GLB,,, | Performed by: INTERNAL MEDICINE

## 2021-06-23 PROCEDURE — 3288F PR FALLS RISK ASSESSMENT DOCUMENTED: ICD-10-PCS | Mod: CPTII,S$GLB,, | Performed by: INTERNAL MEDICINE

## 2021-06-23 PROCEDURE — 1101F PT FALLS ASSESS-DOCD LE1/YR: CPT | Mod: CPTII,S$GLB,, | Performed by: INTERNAL MEDICINE

## 2021-06-23 PROCEDURE — 1101F PR PT FALLS ASSESS DOC 0-1 FALLS W/OUT INJ PAST YR: ICD-10-PCS | Mod: CPTII,S$GLB,, | Performed by: INTERNAL MEDICINE

## 2021-06-23 PROCEDURE — 3008F PR BODY MASS INDEX (BMI) DOCUMENTED: ICD-10-PCS | Mod: CPTII,S$GLB,, | Performed by: INTERNAL MEDICINE

## 2021-06-23 PROCEDURE — 3288F FALL RISK ASSESSMENT DOCD: CPT | Mod: CPTII,S$GLB,, | Performed by: INTERNAL MEDICINE

## 2021-06-23 PROCEDURE — 3075F SYST BP GE 130 - 139MM HG: CPT | Mod: CPTII,S$GLB,, | Performed by: INTERNAL MEDICINE

## 2021-06-23 PROCEDURE — 1159F MED LIST DOCD IN RCRD: CPT | Mod: S$GLB,,, | Performed by: INTERNAL MEDICINE

## 2021-06-23 PROCEDURE — 3075F PR MOST RECENT SYSTOLIC BLOOD PRESS GE 130-139MM HG: ICD-10-PCS | Mod: CPTII,S$GLB,, | Performed by: INTERNAL MEDICINE

## 2021-06-23 PROCEDURE — 1125F AMNT PAIN NOTED PAIN PRSNT: CPT | Mod: S$GLB,,, | Performed by: INTERNAL MEDICINE

## 2021-06-23 PROCEDURE — 3078F PR MOST RECENT DIASTOLIC BLOOD PRESSURE < 80 MM HG: ICD-10-PCS | Mod: CPTII,S$GLB,, | Performed by: INTERNAL MEDICINE

## 2021-06-23 PROCEDURE — 1125F PR PAIN SEVERITY QUANTIFIED, PAIN PRESENT: ICD-10-PCS | Mod: S$GLB,,, | Performed by: INTERNAL MEDICINE

## 2021-06-23 PROCEDURE — 99214 OFFICE O/P EST MOD 30 MIN: CPT | Mod: S$GLB,,, | Performed by: INTERNAL MEDICINE

## 2021-06-23 PROCEDURE — 3008F BODY MASS INDEX DOCD: CPT | Mod: CPTII,S$GLB,, | Performed by: INTERNAL MEDICINE

## 2021-06-24 ENCOUNTER — TELEPHONE (OUTPATIENT)
Dept: CARDIOLOGY | Facility: CLINIC | Age: 71
End: 2021-06-24

## 2021-06-25 ENCOUNTER — TELEPHONE (OUTPATIENT)
Dept: CARDIOLOGY | Facility: CLINIC | Age: 71
End: 2021-06-25

## 2021-06-28 ENCOUNTER — TELEPHONE (OUTPATIENT)
Dept: CARDIOLOGY | Facility: CLINIC | Age: 71
End: 2021-06-28

## 2021-07-09 DIAGNOSIS — Z86.73 HISTORY OF CVA (CEREBROVASCULAR ACCIDENT): Primary | ICD-10-CM

## 2021-07-09 DIAGNOSIS — I48.0 PAF (PAROXYSMAL ATRIAL FIBRILLATION): ICD-10-CM

## 2021-08-13 PROBLEM — N39.0 URINARY TRACT INFECTION, SITE NOT SPECIFIED: Status: ACTIVE | Noted: 2021-08-13

## 2021-08-20 ENCOUNTER — TELEPHONE (OUTPATIENT)
Dept: CARDIOLOGY | Facility: CLINIC | Age: 71
End: 2021-08-20

## 2021-08-23 ENCOUNTER — IMMUNIZATION (OUTPATIENT)
Dept: FAMILY MEDICINE | Facility: CLINIC | Age: 71
End: 2021-08-23
Payer: MEDICARE

## 2021-08-23 DIAGNOSIS — Z23 NEED FOR VACCINATION: Primary | ICD-10-CM

## 2021-08-23 PROCEDURE — 91300 COVID-19, MRNA, LNP-S, PF, 30 MCG/0.3 ML DOSE VACCINE: CPT | Mod: ,,, | Performed by: FAMILY MEDICINE

## 2021-08-23 PROCEDURE — 0003A COVID-19, MRNA, LNP-S, PF, 30 MCG/0.3 ML DOSE VACCINE: ICD-10-PCS | Mod: CV19,,, | Performed by: FAMILY MEDICINE

## 2021-08-23 PROCEDURE — 91300 COVID-19, MRNA, LNP-S, PF, 30 MCG/0.3 ML DOSE VACCINE: ICD-10-PCS | Mod: ,,, | Performed by: FAMILY MEDICINE

## 2021-08-23 PROCEDURE — 0003A COVID-19, MRNA, LNP-S, PF, 30 MCG/0.3 ML DOSE VACCINE: CPT | Mod: CV19,,, | Performed by: FAMILY MEDICINE

## 2021-10-28 ENCOUNTER — CLINICAL SUPPORT (OUTPATIENT)
Dept: CARDIOLOGY | Facility: CLINIC | Age: 71
End: 2021-10-28
Attending: INTERNAL MEDICINE
Payer: MEDICARE

## 2021-10-28 ENCOUNTER — LAB VISIT (OUTPATIENT)
Dept: LAB | Facility: CLINIC | Age: 71
End: 2021-10-28
Attending: INTERNAL MEDICINE
Payer: MEDICARE

## 2021-10-28 VITALS — WEIGHT: 235 LBS | BODY MASS INDEX: 36.88 KG/M2 | HEIGHT: 67 IN

## 2021-10-28 DIAGNOSIS — I10 ESSENTIAL HYPERTENSION: Chronic | ICD-10-CM

## 2021-10-28 DIAGNOSIS — I35.0 MODERATE AORTIC STENOSIS: ICD-10-CM

## 2021-10-28 DIAGNOSIS — I50.30 CONGESTIVE HEART FAILURE WITH LV DIASTOLIC DYSFUNCTION, NYHA CLASS 2: Chronic | ICD-10-CM

## 2021-10-28 DIAGNOSIS — I50.30 CONGESTIVE HEART FAILURE WITH LV DIASTOLIC DYSFUNCTION, NYHA CLASS 2: ICD-10-CM

## 2021-10-28 DIAGNOSIS — I48.0 PAF (PAROXYSMAL ATRIAL FIBRILLATION): Chronic | ICD-10-CM

## 2021-10-28 LAB
ALBUMIN SERPL BCP-MCNC: 3.2 G/DL (ref 3.5–5.2)
ALP SERPL-CCNC: 60 U/L (ref 55–135)
ALT SERPL W/O P-5'-P-CCNC: 34 U/L (ref 10–44)
ANION GAP SERPL CALC-SCNC: 11 MMOL/L (ref 8–16)
AST SERPL-CCNC: 31 U/L (ref 10–40)
BILIRUB SERPL-MCNC: 0.3 MG/DL (ref 0.1–1)
BUN SERPL-MCNC: 32 MG/DL (ref 8–23)
CALCIUM SERPL-MCNC: 9.4 MG/DL (ref 8.7–10.5)
CHLORIDE SERPL-SCNC: 103 MMOL/L (ref 95–110)
CO2 SERPL-SCNC: 30 MMOL/L (ref 23–29)
CREAT SERPL-MCNC: 1.1 MG/DL (ref 0.5–1.4)
EST. GFR  (AFRICAN AMERICAN): 57.6 ML/MIN/1.73 M^2
EST. GFR  (NON AFRICAN AMERICAN): 49.9 ML/MIN/1.73 M^2
GLUCOSE SERPL-MCNC: 96 MG/DL (ref 70–110)
POTASSIUM SERPL-SCNC: 3.9 MMOL/L (ref 3.5–5.1)
PROT SERPL-MCNC: 6.3 G/DL (ref 6–8.4)
SODIUM SERPL-SCNC: 144 MMOL/L (ref 136–145)

## 2021-10-28 PROCEDURE — 93306 TTE W/DOPPLER COMPLETE: CPT | Mod: S$GLB,,, | Performed by: INTERNAL MEDICINE

## 2021-10-28 PROCEDURE — 36415 COLL VENOUS BLD VENIPUNCTURE: CPT | Mod: ,,, | Performed by: INTERNAL MEDICINE

## 2021-10-28 PROCEDURE — 93306 ECHO (CUPID ONLY): ICD-10-PCS | Mod: S$GLB,,, | Performed by: INTERNAL MEDICINE

## 2021-10-28 PROCEDURE — 80053 COMPREHEN METABOLIC PANEL: CPT | Performed by: INTERNAL MEDICINE

## 2021-10-28 PROCEDURE — 36415 PR COLLECTION VENOUS BLOOD,VENIPUNCTURE: ICD-10-PCS | Mod: ,,, | Performed by: INTERNAL MEDICINE

## 2021-10-29 LAB
AV INDEX (PROSTH): 0.3
AV MEAN GRADIENT: 32 MMHG
AV PEAK GRADIENT: 57 MMHG
AV VALVE AREA: 0.86 CM2
AV VELOCITY RATIO: 0.26
BSA FOR ECHO PROCEDURE: 2.24 M2
CV ECHO LV RWT: 0.43 CM
DOP CALC AO PEAK VEL: 3.77 M/S
DOP CALC AO VTI: 86.23 CM
DOP CALC LVOT AREA: 2.9 CM2
DOP CALC LVOT DIAMETER: 1.91 CM
DOP CALC LVOT PEAK VEL: 0.97 M/S
DOP CALC LVOT STROKE VOLUME: 74.57 CM3
DOP CALC MV VTI: 53.09 CM
DOP CALCLVOT PEAK VEL VTI: 26.04 CM
E WAVE DECELERATION TIME: 236.06 MSEC
E/A RATIO: 1.32
E/E' RATIO: 23.27 M/S
ECHO LV POSTERIOR WALL: 1.1 CM (ref 0.6–1.1)
EJECTION FRACTION: 60 %
FRACTIONAL SHORTENING: 30 % (ref 28–44)
INTERVENTRICULAR SEPTUM: 1.21 CM (ref 0.6–1.1)
LA MAJOR: 6.1 CM
LA MINOR: 5.92 CM
LA WIDTH: 4.69 CM
LEFT ATRIUM SIZE: 4.96 CM
LEFT ATRIUM VOLUME INDEX: 54.8 ML/M2
LEFT ATRIUM VOLUME: 118.81 CM3
LEFT INTERNAL DIMENSION IN SYSTOLE: 3.58 CM (ref 2.1–4)
LEFT VENTRICLE DIASTOLIC VOLUME INDEX: 57.6 ML/M2
LEFT VENTRICLE DIASTOLIC VOLUME: 124.99 ML
LEFT VENTRICLE MASS INDEX: 106 G/M2
LEFT VENTRICLE SYSTOLIC VOLUME INDEX: 24.7 ML/M2
LEFT VENTRICLE SYSTOLIC VOLUME: 53.63 ML
LEFT VENTRICULAR INTERNAL DIMENSION IN DIASTOLE: 5.12 CM (ref 3.5–6)
LEFT VENTRICULAR MASS: 230.19 G
LV LATERAL E/E' RATIO: 21.33 M/S
LV SEPTAL E/E' RATIO: 25.6 M/S
MV A" WAVE DURATION": 10.9 MSEC
MV MEAN GRADIENT: 1 MMHG
MV PEAK A VEL: 0.97 M/S
MV PEAK E VEL: 1.28 M/S
MV PEAK GRADIENT: 8 MMHG
MV STENOSIS PRESSURE HALF TIME: 68.46 MS
MV VALVE AREA BY CONTINUITY EQUATION: 1.4 CM2
MV VALVE AREA P 1/2 METHOD: 3.21 CM2
PISA TR MAX VEL: 2.66 M/S
PULM VEIN S/D RATIO: 1.15
PV PEAK D VEL: 0.4 M/S
PV PEAK S VEL: 0.46 M/S
RA MAJOR: 4.96 CM
RA PRESSURE: 3 MMHG
RA WIDTH: 3.28 CM
RIGHT VENTRICULAR END-DIASTOLIC DIMENSION: 3.59 CM
RV TISSUE DOPPLER FREE WALL SYSTOLIC VELOCITY 1 (APICAL 4 CHAMBER VIEW): 11.25 CM/S
SINUS: 2.81 CM
STJ: 2.59 CM
TDI LATERAL: 0.06 M/S
TDI SEPTAL: 0.05 M/S
TDI: 0.06 M/S
TR MAX PG: 28 MMHG
TRICUSPID ANNULAR PLANE SYSTOLIC EXCURSION: 2.44 CM
TV REST PULMONARY ARTERY PRESSURE: 31 MMHG

## 2021-11-01 ENCOUNTER — TELEPHONE (OUTPATIENT)
Dept: CARDIOLOGY | Facility: CLINIC | Age: 71
End: 2021-11-01
Payer: MEDICARE

## 2021-12-15 ENCOUNTER — OFFICE VISIT (OUTPATIENT)
Dept: CARDIOLOGY | Facility: CLINIC | Age: 71
End: 2021-12-15
Payer: MEDICARE

## 2021-12-15 DIAGNOSIS — I73.9 PVD (PERIPHERAL VASCULAR DISEASE): Chronic | ICD-10-CM

## 2021-12-15 DIAGNOSIS — I50.30 CONGESTIVE HEART FAILURE WITH LV DIASTOLIC DYSFUNCTION, NYHA CLASS 2: Primary | Chronic | ICD-10-CM

## 2021-12-15 DIAGNOSIS — Z79.01 LONG TERM (CURRENT) USE OF ANTICOAGULANTS: Chronic | ICD-10-CM

## 2021-12-15 DIAGNOSIS — I48.0 PAF (PAROXYSMAL ATRIAL FIBRILLATION): Chronic | ICD-10-CM

## 2021-12-15 DIAGNOSIS — I25.10 MILD CAD: Chronic | ICD-10-CM

## 2021-12-15 DIAGNOSIS — I10 ESSENTIAL HYPERTENSION: Chronic | ICD-10-CM

## 2021-12-15 DIAGNOSIS — E66.01 SEVERE OBESITY (BMI 35.0-39.9) WITH COMORBIDITY: ICD-10-CM

## 2021-12-15 DIAGNOSIS — I70.0 THORACIC AORTA ATHEROSCLEROSIS: ICD-10-CM

## 2021-12-15 DIAGNOSIS — I35.0 NONRHEUMATIC AORTIC VALVE STENOSIS: Chronic | ICD-10-CM

## 2021-12-15 DIAGNOSIS — N18.31 STAGE 3A CHRONIC KIDNEY DISEASE: Chronic | ICD-10-CM

## 2021-12-15 PROCEDURE — 4010F PR ACE/ARB THEARPY RXD/TAKEN: ICD-10-PCS | Mod: CPTII,S$GLB,, | Performed by: INTERNAL MEDICINE

## 2021-12-15 PROCEDURE — 4010F ACE/ARB THERAPY RXD/TAKEN: CPT | Mod: CPTII,S$GLB,, | Performed by: INTERNAL MEDICINE

## 2021-12-15 PROCEDURE — 99214 PR OFFICE/OUTPT VISIT, EST, LEVL IV, 30-39 MIN: ICD-10-PCS | Mod: S$GLB,,, | Performed by: INTERNAL MEDICINE

## 2021-12-15 PROCEDURE — 99214 OFFICE O/P EST MOD 30 MIN: CPT | Mod: S$GLB,,, | Performed by: INTERNAL MEDICINE

## 2021-12-15 RX ORDER — VALSARTAN 320 MG/1
320 TABLET ORAL DAILY
Qty: 90 TABLET | Refills: 0 | Status: ON HOLD | OUTPATIENT
Start: 2021-12-15 | End: 2023-05-12 | Stop reason: ALTCHOICE

## 2021-12-15 RX ORDER — VALSARTAN 320 MG/1
320 TABLET ORAL
COMMUNITY
End: 2021-12-15 | Stop reason: SDUPTHER

## 2021-12-17 VITALS
BODY MASS INDEX: 36.96 KG/M2 | SYSTOLIC BLOOD PRESSURE: 134 MMHG | HEART RATE: 69 BPM | WEIGHT: 235.5 LBS | DIASTOLIC BLOOD PRESSURE: 73 MMHG | HEIGHT: 67 IN

## 2021-12-23 ENCOUNTER — CLINICAL SUPPORT (OUTPATIENT)
Dept: CARDIOLOGY | Facility: HOSPITAL | Age: 71
End: 2021-12-23
Attending: INTERNAL MEDICINE
Payer: MEDICARE

## 2021-12-23 DIAGNOSIS — I73.9 PVD (PERIPHERAL VASCULAR DISEASE): ICD-10-CM

## 2021-12-23 LAB
LEFT ANT TIBIAL SYS PSV: 77 CM/S
LEFT CFA PSV: 135 CM/S
LEFT PERONEAL SYS PSV: 99 CM/S
LEFT POPLITEAL PSV: 72 CM/S
LEFT PROFUNDA SYS PSV: 149 CM/S
LEFT SUPER FEMORAL DIST SYS PSV: 75 CM/S
LEFT SUPER FEMORAL MID SYS PSV: 138 CM/S
LEFT SUPER FEMORAL OSTIAL SYS PSV: 100 CM/S
LEFT SUPER FEMORAL PROX SYS PSV: 117 CM/S
LEFT TIB/PER TRUNK SYS PSV: 60 CM/S
OHS CV LEFT LOWER EXTREMITY ABI (NO CALC): 0.98
OHS CV RIGHT ABI LOWER EXTREMITY (NO CALC): 0.98
RIGHT ANT TIBIAL SYS PSV: 86 CM/S
RIGHT CFA PSV: 116 CM/S
RIGHT PERONEAL SYS PSV: 76 CM/S
RIGHT POPLITEAL PSV: 83 CM/S
RIGHT POST TIBIAL SYS PSV: 119 CM/S
RIGHT PROFUNDA SYS PSV: 51 CM/S
RIGHT SUPER FEMORAL DIST SYS PSV: 87 CM/S
RIGHT SUPER FEMORAL MID SYS PSV: 87 CM/S
RIGHT SUPER FEMORAL OSTIAL SYS PSV: 90 CM/S
RIGHT SUPER FEMORAL PROX SYS PSV: 116 CM/S
RIGHT TIB/PER TRUNK SYS PSV: 84 CM/S

## 2021-12-23 PROCEDURE — 93925 LOWER EXTREMITY STUDY: CPT | Mod: 26,,, | Performed by: INTERNAL MEDICINE

## 2021-12-23 PROCEDURE — 93925 LOWER EXTREMITY STUDY: CPT | Mod: PO

## 2021-12-23 PROCEDURE — 93925 CV US DOPPLER ARTERIAL LEGS BILATERAL (CUPID ONLY): ICD-10-PCS | Mod: 26,,, | Performed by: INTERNAL MEDICINE

## 2022-01-07 ENCOUNTER — TELEPHONE (OUTPATIENT)
Dept: CARDIOLOGY | Facility: CLINIC | Age: 72
End: 2022-01-07
Payer: MEDICARE

## 2022-01-07 RX ORDER — AMLODIPINE BESYLATE 2.5 MG/1
2.5 TABLET ORAL 2 TIMES DAILY
Qty: 60 TABLET | Refills: 3 | Status: SHIPPED | OUTPATIENT
Start: 2022-01-07 | End: 2022-04-04

## 2022-01-07 NOTE — TELEPHONE ENCOUNTER
"Spoke to pt over the phone , " My BP is very high . Last night it was 227/97 and after meds 209/86. My BP this morning 163/72 , I took an extra pill of valsartan this morning , I usually take this med at bed time. My Bp 30 min ago was 118/79 "please advise. Thank you   "

## 2022-01-07 NOTE — TELEPHONE ENCOUNTER
"Spoke to pt over the phone inform her "Do not take extra valsartan your on the maximum dose of 320 mg, increase amlodipine to 2.5 mg b.i.d. ", per dr Guerrier. PT VU   "

## 2022-01-07 NOTE — TELEPHONE ENCOUNTER
----- Message from Goran Rincon sent at 1/7/2022 12:10 PM CST -----  Contact: pt at [phone]  Type: Needs Medical Advice  Who Called:  pt  Best Call Back Number: [phone]  Additional Information: pt is calling the office to speak to the nurse. Please call back and advise.  PER THE PT HER BLOOD PRESSURE HAS BEEN GOING TO HIGH IT STARTED THAT LAST NIGHT

## 2022-01-15 LAB
ALBUMIN SERPL-MCNC: 4 G/DL (ref 3.7–4.7)
ALBUMIN/GLOB SERPL: 1.7 {RATIO} (ref 1.2–2.2)
ALP SERPL-CCNC: 74 IU/L (ref 44–121)
ALT SERPL-CCNC: 23 IU/L (ref 0–32)
AST SERPL-CCNC: 24 IU/L (ref 0–40)
BILIRUB SERPL-MCNC: 0.2 MG/DL (ref 0–1.2)
BUN SERPL-MCNC: 24 MG/DL (ref 8–27)
BUN/CREAT SERPL: 22 (ref 12–28)
CALCIUM SERPL-MCNC: 9.2 MG/DL (ref 8.7–10.3)
CHLORIDE SERPL-SCNC: 103 MMOL/L (ref 96–106)
CO2 SERPL-SCNC: 27 MMOL/L (ref 20–29)
CREAT SERPL-MCNC: 1.08 MG/DL (ref 0.57–1)
GLOBULIN SER CALC-MCNC: 2.4 G/DL (ref 1.5–4.5)
GLUCOSE SERPL-MCNC: 185 MG/DL (ref 65–99)
HGB BLD-MCNC: 11.9 G/DL (ref 11.1–15.9)
POTASSIUM SERPL-SCNC: 4.2 MMOL/L (ref 3.5–5.2)
PROT SERPL-MCNC: 6.4 G/DL (ref 6–8.5)
SODIUM SERPL-SCNC: 144 MMOL/L (ref 134–144)

## 2022-01-28 ENCOUNTER — TELEPHONE (OUTPATIENT)
Dept: CARDIOLOGY | Facility: CLINIC | Age: 72
End: 2022-01-28
Payer: MEDICARE

## 2022-01-28 NOTE — TELEPHONE ENCOUNTER
----- Message from Lakesha Franks sent at 1/28/2022  2:12 PM CST -----  Type: Needs Medical Advice  Who Called:  Nola with [facility]  Best Call Back Number:   Additional Information: Calling to get a diagnosis code to properly bill the patient's labs

## 2022-02-08 ENCOUNTER — TELEPHONE (OUTPATIENT)
Dept: CARDIOLOGY | Facility: CLINIC | Age: 72
End: 2022-02-08
Payer: MEDICARE

## 2022-02-08 NOTE — TELEPHONE ENCOUNTER
----- Message from Luis Eduardo YOUNG Keirylisa sent at 2/8/2022  2:07 PM CST -----  Contact: Letty[facility] Billing Dept  Type: Needs Medical Advice  Who Called:  Letty/[facility] Billing [facility]  Symptoms (please be specific):  n/a  How long has patient had these symptoms:  n/a  Pharmacy name and phone #:  na/  Best Call Back Number: [phone], ref# [id number]  Additional Information: Letty called in and stated patient came in on 1/14/22 for lab work.  Letty stated the diagnoses code of Z79.0 is incorrect.  Letty stated it is missing a digit.   Fax number: [phone]

## 2025-02-08 PROBLEM — L03.115 CELLULITIS OF RIGHT LOWER EXTREMITY: Status: ACTIVE | Noted: 2025-02-08

## 2025-02-08 PROBLEM — S72.001A CLOSED FRACTURE OF RIGHT HIP: Status: ACTIVE | Noted: 2025-02-08

## 2025-02-08 PROBLEM — E03.9 HYPOTHYROIDISM: Status: ACTIVE | Noted: 2025-02-08

## 2025-02-08 PROBLEM — D64.9 ANEMIA: Status: ACTIVE | Noted: 2025-02-08

## 2025-02-10 PROBLEM — S72.141A CLOSED DISPLACED INTERTROCHANTERIC FRACTURE OF RIGHT FEMUR: Status: ACTIVE | Noted: 2025-02-08

## 2025-02-11 PROBLEM — D62 ACUTE BLOOD LOSS ANEMIA: Status: ACTIVE | Noted: 2025-02-08

## 2025-02-11 PROBLEM — Z74.09 OTHER REDUCED MOBILITY: Status: ACTIVE | Noted: 2025-02-11

## 2025-02-25 ENCOUNTER — OFFICE VISIT (OUTPATIENT)
Dept: ORTHOPEDICS | Facility: CLINIC | Age: 75
End: 2025-02-25
Payer: MEDICARE

## 2025-02-25 ENCOUNTER — HOSPITAL ENCOUNTER (OUTPATIENT)
Dept: RADIOLOGY | Facility: HOSPITAL | Age: 75
Discharge: HOME | End: 2025-02-25
Attending: ORTHOPAEDIC SURGERY
Payer: MEDICARE

## 2025-02-25 VITALS — WEIGHT: 205.94 LBS | HEIGHT: 65 IN | BODY MASS INDEX: 34.31 KG/M2

## 2025-02-25 DIAGNOSIS — S72.141A CLOSED DISPLACED INTERTROCHANTERIC FRACTURE OF RIGHT FEMUR, INITIAL ENCOUNTER: ICD-10-CM

## 2025-02-25 DIAGNOSIS — S72.141A CLOSED DISPLACED INTERTROCHANTERIC FRACTURE OF RIGHT FEMUR, INITIAL ENCOUNTER: Primary | ICD-10-CM

## 2025-02-25 PROCEDURE — 73552 X-RAY EXAM OF FEMUR 2/>: CPT | Mod: TC,PO,RT

## 2025-02-25 PROCEDURE — 1159F MED LIST DOCD IN RCRD: CPT | Mod: CPTII,S$GLB,, | Performed by: ORTHOPAEDIC SURGERY

## 2025-02-25 PROCEDURE — 1101F PT FALLS ASSESS-DOCD LE1/YR: CPT | Mod: CPTII,S$GLB,, | Performed by: ORTHOPAEDIC SURGERY

## 2025-02-25 PROCEDURE — 99999 PR PBB SHADOW E&M-EST. PATIENT-LVL IV: CPT | Mod: PBBFAC,,, | Performed by: ORTHOPAEDIC SURGERY

## 2025-02-25 PROCEDURE — 99024 POSTOP FOLLOW-UP VISIT: CPT | Mod: S$GLB,,, | Performed by: ORTHOPAEDIC SURGERY

## 2025-02-25 PROCEDURE — 73552 X-RAY EXAM OF FEMUR 2/>: CPT | Mod: 26,RT,, | Performed by: RADIOLOGY

## 2025-02-25 PROCEDURE — 3288F FALL RISK ASSESSMENT DOCD: CPT | Mod: CPTII,S$GLB,, | Performed by: ORTHOPAEDIC SURGERY

## 2025-02-25 PROCEDURE — 1125F AMNT PAIN NOTED PAIN PRSNT: CPT | Mod: CPTII,S$GLB,, | Performed by: ORTHOPAEDIC SURGERY

## 2025-02-25 PROCEDURE — 1160F RVW MEDS BY RX/DR IN RCRD: CPT | Mod: CPTII,S$GLB,, | Performed by: ORTHOPAEDIC SURGERY

## 2025-02-25 RX ORDER — CYCLOBENZAPRINE HCL 10 MG
10 TABLET ORAL 3 TIMES DAILY
COMMUNITY
Start: 2025-02-19

## 2025-02-25 RX ORDER — CLINDAMYCIN PHOSPHATE 10 MG/G
GEL TOPICAL
COMMUNITY
Start: 2025-02-11

## 2025-02-25 RX ORDER — CLINDAMYCIN HYDROCHLORIDE 300 MG/1
300 CAPSULE ORAL EVERY 6 HOURS
COMMUNITY
Start: 2025-02-21

## 2025-02-27 NOTE — PROGRESS NOTES
Chief Complaint   Patient presents with    Right Femur - Pain       HPI:  74 y.o. returns to clinic today status post  right hip IMN 2 weeks ago. Pain is mild. Patient is compliant most of the time with restrictions.     R hip    Incision healed. No erythema or fluctuance. Overall normal alignment. Mild point TTP about the fracture site. Decreased ROM due to stiffness. Compartments soft. Skin intact. NVI distally.      X-rays were performed today, personally reviewed by me and findings discussed with the patient.  2 views of the right hip show implants intact in good position    Closed displaced intertrochanteric fracture of right femur, initial encounter        Staples out. RTC 4 weeks.

## 2025-03-12 DIAGNOSIS — S72.141A CLOSED DISPLACED INTERTROCHANTERIC FRACTURE OF RIGHT FEMUR, INITIAL ENCOUNTER: Primary | ICD-10-CM

## 2025-03-20 DIAGNOSIS — S72.141A CLOSED DISPLACED INTERTROCHANTERIC FRACTURE OF RIGHT FEMUR, INITIAL ENCOUNTER: ICD-10-CM

## 2025-03-20 RX ORDER — HYDROMORPHONE HYDROCHLORIDE 4 MG/1
4 TABLET ORAL EVERY 6 HOURS PRN
Qty: 22 TABLET | Refills: 0 | Status: SHIPPED | OUTPATIENT
Start: 2025-03-20

## 2025-03-20 RX ORDER — TRAMADOL HYDROCHLORIDE 50 MG/1
50 TABLET ORAL EVERY 6 HOURS PRN
Qty: 28 TABLET | Refills: 0 | Status: SHIPPED | OUTPATIENT
Start: 2025-03-20

## 2025-03-20 NOTE — TELEPHONE ENCOUNTER
----- Message from Bridger sent at 3/20/2025 11:35 AM CDT -----  [id number] [place] - [address] [id number]: [phone] Fax: [phone]-  [phone]

## 2025-03-20 NOTE — TELEPHONE ENCOUNTER
----- Message from Bridger sent at 3/20/2025 11:35 AM CDT -----  Patient would [facility] a [id number] [place] - [address] [address]Mhanf: [phone] Fax: [phone]-  [phone]

## 2025-04-03 ENCOUNTER — TELEPHONE (OUTPATIENT)
Dept: ORTHOPEDICS | Facility: CLINIC | Age: 75
End: 2025-04-03
Payer: MEDICARE

## 2025-04-03 NOTE — TELEPHONE ENCOUNTER
Patient states she has a rash. Instructed patient to take benadryl for rash. Also, if worsens or not better needs to see PCP. Understanding verbalized.

## 2025-04-03 NOTE — TELEPHONE ENCOUNTER
----- Message from Med Assistant Luis Eduardo sent at 4/3/2025  1:19 PM CDT -----  Contact: Dr. Priest's office PCP/Kojo De Jesus has rash all over her body that nothing is helping.  Patient states pain meds are not helping.Patient call [phone]

## 2025-04-09 DIAGNOSIS — L03.115 CELLULITIS OF RIGHT LEG: Primary | ICD-10-CM

## 2025-04-24 DIAGNOSIS — S72.141A CLOSED DISPLACED INTERTROCHANTERIC FRACTURE OF RIGHT FEMUR, INITIAL ENCOUNTER: ICD-10-CM

## 2025-04-24 RX ORDER — HYDROMORPHONE HYDROCHLORIDE 4 MG/1
4 TABLET ORAL EVERY 6 HOURS PRN
Qty: 22 TABLET | Refills: 0 | OUTPATIENT
Start: 2025-04-24

## 2025-06-11 NOTE — LETTER
Detail Level: Detailed March 31, 2017    Aimee Aquino  [address]  [facility] [address]             [facility]  [address]  [facility] [address]  Phone: [phone] Dear Mrs. Aquino:    [facility] is committed to your overall health and would like to ensure that you are up to date on your recommended health testing.   Dr. John has found that you may be due for the following:    Tetanus immunization  Shingles immunization  Pneumonia immunization  Hemoglobin A1C  Diabetic Foot Exam  Annual Dilated Eye Exam    If you have had any of the above done at another facility, please let us know by calling or faxing to the numbers below so that your medical record can be updated. If you have a copy of these records, please fax them to the fax number below.  If not, please call [phone] so that we can get the necessary information to obtain copies from that facility.     Otherwise, please schedule these appointments at your earliest convenience by calling [phone] or going to [email or web address].        If you have any questions or concerns, please don't hesitate to call.    Sincerely,  Abimbola Ramos  Clinical Care Coordinator  [facility]  [address] [place] [address]  Phone: [phone]   Fax: [phone]           Quality 226: Preventive Care And Screening: Tobacco Use: Screening And Cessation Intervention: Patient screened for tobacco use and is an ex/non-smoker Quality 130: Documentation Of Current Medications In The Medical Record: Current Medications Documented